# Patient Record
Sex: FEMALE | NOT HISPANIC OR LATINO | URBAN - METROPOLITAN AREA
[De-identification: names, ages, dates, MRNs, and addresses within clinical notes are randomized per-mention and may not be internally consistent; named-entity substitution may affect disease eponyms.]

---

## 2024-05-13 RX ORDER — NAPROXEN SODIUM 220 MG
220 TABLET ORAL
COMMUNITY

## 2024-05-13 RX ORDER — HYDROCHLOROTHIAZIDE 12.5 MG/1
12.5 TABLET ORAL DAILY
COMMUNITY
Start: 2024-01-25

## 2024-05-13 RX ORDER — ALENDRONATE SODIUM 70 MG/1
70 TABLET ORAL
COMMUNITY
Start: 2024-02-02 | End: 2024-05-14

## 2024-05-14 ENCOUNTER — OFFICE VISIT (OUTPATIENT)
Dept: INTERNAL MEDICINE | Facility: IMAGING CENTER | Age: 64
End: 2024-05-14
Payer: COMMERCIAL

## 2024-05-14 VITALS
OXYGEN SATURATION: 97 % | RESPIRATION RATE: 14 BRPM | HEIGHT: 63 IN | HEART RATE: 61 BPM | WEIGHT: 151 LBS | BODY MASS INDEX: 26.75 KG/M2 | SYSTOLIC BLOOD PRESSURE: 108 MMHG | DIASTOLIC BLOOD PRESSURE: 64 MMHG | TEMPERATURE: 97.6 F

## 2024-05-14 DIAGNOSIS — Z00.00 LABORATORY EXAMINATION ORDERED AS PART OF A ROUTINE GENERAL MEDICAL EXAMINATION: ICD-10-CM

## 2024-05-14 DIAGNOSIS — M80.00XA AGE-RELATED OSTEOPOROSIS WITH CURRENT PATHOLOGICAL FRACTURE, INITIAL ENCOUNTER: Primary | ICD-10-CM

## 2024-05-14 DIAGNOSIS — D80.1 HYPOGAMMAGLOBULINEMIA (HCC): ICD-10-CM

## 2024-05-14 DIAGNOSIS — Z76.89 ESTABLISHING CARE WITH NEW DOCTOR, ENCOUNTER FOR: ICD-10-CM

## 2024-05-14 PROBLEM — M81.0 OSTEOPOROSIS: Status: ACTIVE | Noted: 2018-11-01

## 2024-05-14 PROBLEM — R82.994 HYPERCALCINURIA: Status: ACTIVE | Noted: 2023-06-07

## 2024-05-14 PROBLEM — Z85.828 HISTORY OF SKIN CANCER: Status: ACTIVE | Noted: 2021-02-11

## 2024-05-14 PROBLEM — M19.90 ARTHRITIS: Status: ACTIVE | Noted: 2021-02-11

## 2024-05-14 PROBLEM — R76.8 LOW IMMUNOGLOBULIN LEVEL: Status: ACTIVE | Noted: 2024-03-12

## 2024-05-14 PROCEDURE — 3074F SYST BP LT 130 MM HG: CPT | Performed by: FAMILY MEDICINE

## 2024-05-14 PROCEDURE — 99204 OFFICE O/P NEW MOD 45 MIN: CPT | Performed by: FAMILY MEDICINE

## 2024-05-14 PROCEDURE — 3078F DIAST BP <80 MM HG: CPT | Performed by: FAMILY MEDICINE

## 2024-05-14 RX ORDER — CHLORTHALIDONE 25 MG/1
TABLET ORAL
COMMUNITY
Start: 2024-03-16 | End: 2024-05-14

## 2024-05-14 ASSESSMENT — PATIENT HEALTH QUESTIONNAIRE - PHQ9: CLINICAL INTERPRETATION OF PHQ2 SCORE: 0

## 2024-05-17 NOTE — PROGRESS NOTES
Verbal consent was acquired by the patient to use [employer] listening note generation during this visit     Patient is a 64 y.o.female.new  patient      History of Present Illness  The patient presents for evaluation of multiple medical concerns.    The patient underwent a DEXA scan under the supervision of her gyn, which revealed a significant decrease in bone density, exceeding nl decline .  She had a decrease in her bone density to 9 percent within a year.   She was initiated on vitamin D supplementation, adhering to a ca++   rich  diet w/   dairy products such as yogurt, coffee, cereal, ice cream, and cottage cheese.     Her   physician had planned to prescribe Fosamax, but due to the presence of calcium in her urine, a  panel was conducted.     Subsequently, she was referred to an endocrinologist, who has since retired, who prescribed hydrochlorothiazide and recommended a follow-up with her primary care physician. Her primary care physician then referred her to another endocrinologist in [place], who conducted a 24-hour urine test, which yielded consistent results.     Despite being on hydrochlorothiazide, her calcium levels remained in the 300 to 400 range.     Her endocrinologist increased the hydrochlorothiazide dosage to 25 mg, but she experienced dizziness and lightheadedness within a few weeks. She was advised to monitor her blood pressure using a blood pressure cuff and discontinue the medication if it drops below this dosage.     She reduced the hydrochlorothiazide dosage to 12.5 mg and began monitoring her blood pressure.    The patient was diagnosed with hypogammaglobulinemia by her hematologist. The hematologist did not detect  multiple myeloma and recommended a follow-up in 1 year.       Her last cholesterol check was in 06/2023. Her last Pap smear was conducted in 01/2023 by her   and she denies any abnormal gynecological issues.     She undergoes a colonoscopy every 5 years due to  "her family history. Her last colonoscopy was performed last year, which did not reveal any polyps.    The patient occasionally uses CBD   for back pain.     She has a long-standing history of low back pain, which has been debilitating.   She has been exercising and working on her core muscles. She has considered surgery and has received cortisone injections at the facet joints. Initially, she was seeing an orthopedist, who informed her of a 60 to 70 percent chance of resolving the pain with surgery. The first cortisone injection provided relief for over a year, but the second injection did not provide any relief. She denies any sciatica.     She has started Pilates, which has provided significant relief. She engages in walking and hiking for exercise, but can not run.     She smoked in high school.     She drinks a glass of wine 4 nights a week, 2 weeks, and then might not have it for 4 months.   She lives in [facility].     Her mother is still alive. Her father passed away at 92 years old. He had  heart disease, diabetes, and a couple of heart attacks, but no bypasses.  She has a strong family history of colon cancer on her maternal side. Her maternal grandmother and grandfather had colon cancer. Her mother, brothers, and father had polyps. Her grandfather had a colostomy. He passed away at 78 years old. Her mother had a hip replacement. She has osteoporosis and scoliosis of the spine. She does not have diabetes or heart disease.            /64   Pulse 61   Temp 36.4 °C (97.6 °F)   Resp 14   Ht 1.6 m (5' 3\")   Wt 68.5 kg (151 lb)   SpO2 97% , Body mass index is 26.75 kg/m².    Physical Exam      Physical Exam  Constitutional:       General: She is not in acute distress.     Appearance: Normal appearance. She is not ill-appearing, toxic-appearing or diaphoretic.   HENT:      Head: Normocephalic and atraumatic.   Eyes:      Conjunctiva/sclera: Conjunctivae normal.   Cardiovascular:      Rate and Rhythm: " Normal rate.   Pulmonary:      Effort: Pulmonary effort is normal.   Musculoskeletal:      Cervical back: Neck supple.   Neurological:      Mental Status: She is alert and oriented to person, place, and time. Mental status is at baseline.   Psychiatric:         Mood and Affect: Mood normal.         Behavior: Behavior normal.         Thought Content: Thought content normal.         Judgment: Judgment normal.             Results  Laboratory Studies  DEXA scan showed a significant amount of bone loss. Calcium in urine, low globulin, low immunoglobulin in urine.          Assessment & Plan  1. Osteoporosis.  A referral will be made for the patient to consult with an endocrinologist. Additionally, a prescription for Binosto was discussed    2. Hypogammaglobulinemia.  The patient's SPEP protein levels were found to be abnormal, prompting a referral to a hematologist.   referral to  hematology made    3. Health maintenance.  Laboratory tests will be conducted within the upcoming month.         1. Age-related osteoporosis with current pathological fracture, initial encounter  Referral to Endocrinology      2. Hypogammaglobulinemia (HCC)  Referral to Hematology Oncology      3. Laboratory examination ordered as part of a routine general medical examination  CBC WITH DIFFERENTIAL    TSH    Lipid Profile    Comp Metabolic Panel    VITAMIN D 25-HYDROXY    VITAMIN B12    HEMOGLOBIN A1C      4. Establishing care with new doctor, encounter for               This note was created using voice recognition software (Dragon). The accuracy of the dictation is limited by the abilities of the software. I have reviewed the note prior to signing, however some errors in grammar and context are still possible. If you have any questions related to this note please do not hesitate to contact our office.

## 2024-05-22 ENCOUNTER — TELEPHONE (OUTPATIENT)
Dept: ENDOCRINOLOGY | Facility: MEDICAL CENTER | Age: 64
End: 2024-05-22
Payer: COMMERCIAL

## 2024-05-22 NOTE — TELEPHONE ENCOUNTER
Left pt vm that Dr. CARVAJAL has cancellation for June 25th at 7:50. Pt had preferred being scheduled with Dr. CARVAJAL.

## 2024-06-17 ENCOUNTER — HOSPITAL ENCOUNTER (OUTPATIENT)
Dept: RADIOLOGY | Facility: MEDICAL CENTER | Age: 64
End: 2024-06-17
Attending: FAMILY MEDICINE
Payer: COMMERCIAL

## 2024-06-17 ENCOUNTER — OFFICE VISIT (OUTPATIENT)
Dept: INTERNAL MEDICINE | Facility: IMAGING CENTER | Age: 64
End: 2024-06-17
Payer: COMMERCIAL

## 2024-06-17 VITALS
SYSTOLIC BLOOD PRESSURE: 118 MMHG | TEMPERATURE: 97.1 F | DIASTOLIC BLOOD PRESSURE: 72 MMHG | OXYGEN SATURATION: 96 % | HEART RATE: 71 BPM | RESPIRATION RATE: 14 BRPM

## 2024-06-17 DIAGNOSIS — J20.9 ACUTE BRONCHITIS, UNSPECIFIED ORGANISM: ICD-10-CM

## 2024-06-17 DIAGNOSIS — R05.2 SUBACUTE COUGH: ICD-10-CM

## 2024-06-17 DIAGNOSIS — J01.40 SUBACUTE PANSINUSITIS: ICD-10-CM

## 2024-06-17 DIAGNOSIS — R06.02 SHORTNESS OF BREATH: ICD-10-CM

## 2024-06-17 PROCEDURE — 3078F DIAST BP <80 MM HG: CPT | Performed by: FAMILY MEDICINE

## 2024-06-17 PROCEDURE — 3074F SYST BP LT 130 MM HG: CPT | Performed by: FAMILY MEDICINE

## 2024-06-17 PROCEDURE — 71046 X-RAY EXAM CHEST 2 VIEWS: CPT

## 2024-06-17 PROCEDURE — 99214 OFFICE O/P EST MOD 30 MIN: CPT | Performed by: FAMILY MEDICINE

## 2024-06-17 RX ORDER — DOXYCYCLINE HYCLATE 100 MG
100 TABLET ORAL 2 TIMES DAILY
Qty: 20 TABLET | Refills: 0 | Status: SHIPPED | OUTPATIENT
Start: 2024-06-17

## 2024-06-18 NOTE — PATIENT INSTRUCTIONS
Acute Bronchitis, Adult    Acute bronchitis is when air tubes in the lungs (bronchi) suddenly get swollen. The condition can make it hard for you to breathe. In adults, acute bronchitis usually goes away within 2 weeks. A cough caused by bronchitis may last up to 3 weeks. Smoking, allergies, and asthma can make the condition worse.  What are the causes?  Germs that cause cold and flu (viruses). The most common cause of this condition is the virus that causes the common cold.  Bacteria.  Substances that bother (irritate) the lungs, including:  Smoke from cigarettes and other types of tobacco.  Dust and pollen.  Fumes from chemicals, gases, or burned fuel.  Indoor or outdoor air pollution.  What increases the risk?  A weak body's defense system. This is also called the immune system.  Any condition that affects your lungs and breathing, such as asthma.  What are the signs or symptoms?  A cough.  Coughing up clear, yellow, or green mucus.  Making high-pitched whistling sounds when you breathe, most often when you breathe out (wheezing).  Runny or stuffy nose.  Having too much mucus in your lungs (chest congestion).  Shortness of breath.  Body aches.  A sore throat.  How is this treated?  Acute bronchitis may go away over time without treatment. Your doctor may tell you to:  Drink more fluids. This will help thin your mucus so it is easier to cough up.  Use a device that gets medicine into your lungs (inhaler).  Use a vaporizer or a humidifier. These are machines that add water to the air. This helps with coughing and poor breathing.  Take a medicine that thins mucus and helps clear it from your lungs.  Take a medicine that prevents or stops coughing.  It is not common to take an antibiotic medicine for this condition.  Follow these instructions at home:    Take over-the-counter and prescription medicines only as told by your doctor.  Use an inhaler, vaporizer, or humidifier as told by your doctor.  Take two teaspoons  (10 mL) of honey at bedtime. This helps lessen your coughing at night.  Drink enough fluid to keep your pee (urine) pale yellow.  Do not smoke or use any products that contain nicotine or tobacco. If you need help quitting, ask your doctor.  Get a lot of rest.  Return to your normal activities when your doctor says that it is safe.  Keep all follow-up visits.  How is this prevented?    Wash your hands often with soap and water for at least 20 seconds. If you cannot use soap and water, use hand .  Avoid contact with people who have cold symptoms.  Try not to touch your mouth, nose, or eyes with your hands.  Avoid breathing in smoke or chemical fumes.  Make sure to get the flu shot every year.  Contact a doctor if:  Your symptoms do not get better in 2 weeks.  You have trouble coughing up the mucus.  Your cough keeps you awake at night.  You have a fever.  Get help right away if:  You cough up blood.  You have chest pain.  You have very bad shortness of breath.  You faint or keep feeling like you are going to faint.  You have a very bad headache.  Your fever or chills get worse.  These symptoms may be an emergency. Get help right away. Call your local emergency services (911 in the [place]  Do not wait to see if the symptoms will go away.  Do not drive yourself to the hospital.  Summary  Acute bronchitis is when air tubes in the lungs (bronchi) suddenly get swollen. In adults, acute bronchitis usually goes away within 2 weeks.  Drink more fluids. This will help thin your mucus so it is easier to cough up.  Take over-the-counter and prescription medicines only as told by your doctor.  Contact a doctor if your symptoms do not improve after 2 weeks of treatment.  This information is not intended to replace advice given to you by your health care provider. Make sure you discuss any questions you have with your health care provider.  Document Revised: 04/20/2022 Document Reviewed: 04/20/2022  [employer] Patient  Education © 2023 [employer]

## 2024-06-18 NOTE — PROGRESS NOTES
Chief Complaint   Patient presents with    Cough     Back pain with it.        HPI: Patient is a 64 y.o. female complaining of 2 m    of illness including: harsh cough, ear pain, nasal congestion, sinus involvement .      Similarly ill exposures: no.  Treatments tried: antihist./decong.  She  has no history on file for tobacco use..     ROS:  No fever, nausea, changes in bowel movements or skin rash.       I reviewed the patient's medications, allergies and medical history:  Current Outpatient Medications   Medication Sig Dispense Refill    doxycycline (VIBRAMYCIN) 100 MG Tab Take 1 Tablet by mouth 2 times a day. 20 Tablet 0    naproxen (ANAPROX) 220 MG tablet Take 220 mg by mouth.      hydroCHLOROthiazide 12.5 MG tablet Take 12.5 mg by mouth every day.      Cholecalciferol 1.25 MG (31153 UT) Tab Take 1 Capsule by mouth every 7 days.       No current facility-administered medications for this visit.     Codeine and Penicillins  History reviewed. No pertinent past medical history.     EXAM:  /72   Pulse 71   Temp 36.2 °C (97.1 °F)   Resp 14   SpO2 96%   General: Alert, no conversational dyspnea or audible wheeze, non-toxic appearance.  Eyes: PERRL, conjunctiva slightly injected, no eye discharge.  Ears: Normal pinnae,TM's air/fluid interface bilaterally.        Throat: Erythematous injection without exudate.   Neck: Supple   Lungs: Clear to auscultation bilaterally, no wheeze, crackles or rhonchi.   Heart: Regular rate without murmur.  Skin: Warm and dry without rash.         DX-CHEST-2 VIEWS  Narrative: 6/17/2024 12:53 PM    HISTORY/REASON FOR EXAM:  Shortness of Breath. Cough and mid back pain for one month.    TECHNIQUE/EXAM DESCRIPTION AND NUMBER OF VIEWS:  Two views of the chest.    COMPARISON:  None.    FINDINGS:  The cardiomediastinal silhouette is normal in size.  No pulmonary infiltrates or consolidations are noted.  No pleural effusions are appreciated.  No visible pneumothorax.  Remaining bony  and soft tissue structures are within normal limits.  Impression: No radiographic evidence of acute cardiopulmonary disease.     ASSESSMENT:     1. Acute bronchitis, unspecified organism       2. Subacute pansinusitis         PLAN:  Doxicycline x 10 d    Follow-up in office or urgent care for worsening symptoms, difficulty breathing, lack of expected recovery, or should new symptoms or problems arise.

## 2024-06-19 ENCOUNTER — HOSPITAL ENCOUNTER (OUTPATIENT)
Facility: MEDICAL CENTER | Age: 64
End: 2024-06-19
Attending: FAMILY MEDICINE
Payer: COMMERCIAL

## 2024-06-19 ENCOUNTER — NON-PROVIDER VISIT (OUTPATIENT)
Dept: INTERNAL MEDICINE | Facility: IMAGING CENTER | Age: 64
End: 2024-06-19
Payer: COMMERCIAL

## 2024-06-19 DIAGNOSIS — Z00.00 LABORATORY EXAMINATION ORDERED AS PART OF A ROUTINE GENERAL MEDICAL EXAMINATION: ICD-10-CM

## 2024-06-19 PROCEDURE — 84443 ASSAY THYROID STIM HORMONE: CPT

## 2024-06-19 PROCEDURE — 83036 HEMOGLOBIN GLYCOSYLATED A1C: CPT

## 2024-06-19 PROCEDURE — 80053 COMPREHEN METABOLIC PANEL: CPT

## 2024-06-19 PROCEDURE — 82607 VITAMIN B-12: CPT

## 2024-06-19 PROCEDURE — 85025 COMPLETE CBC W/AUTO DIFF WBC: CPT

## 2024-06-19 PROCEDURE — 80061 LIPID PANEL: CPT

## 2024-06-19 PROCEDURE — 82306 VITAMIN D 25 HYDROXY: CPT

## 2024-06-20 LAB
25(OH)D3 SERPL-MCNC: 53 NG/ML (ref 30–100)
ALBUMIN SERPL BCP-MCNC: 4.3 G/DL (ref 3.2–4.9)
ALBUMIN/GLOB SERPL: 2 G/DL
ALP SERPL-CCNC: 75 U/L (ref 30–99)
ALT SERPL-CCNC: 14 U/L (ref 2–50)
ANION GAP SERPL CALC-SCNC: 9 MMOL/L (ref 7–16)
AST SERPL-CCNC: 21 U/L (ref 12–45)
BASOPHILS # BLD AUTO: 0.6 % (ref 0–1.8)
BASOPHILS # BLD: 0.03 K/UL (ref 0–0.12)
BILIRUB SERPL-MCNC: 2 MG/DL (ref 0.1–1.5)
BUN SERPL-MCNC: 14 MG/DL (ref 8–22)
CALCIUM ALBUM COR SERPL-MCNC: 9.2 MG/DL (ref 8.5–10.5)
CALCIUM SERPL-MCNC: 9.4 MG/DL (ref 8.5–10.5)
CHLORIDE SERPL-SCNC: 101 MMOL/L (ref 96–112)
CHOLEST SERPL-MCNC: 258 MG/DL (ref 100–199)
CO2 SERPL-SCNC: 26 MMOL/L (ref 20–33)
CREAT SERPL-MCNC: 0.6 MG/DL (ref 0.5–1.4)
EOSINOPHIL # BLD AUTO: 0.1 K/UL (ref 0–0.51)
EOSINOPHIL NFR BLD: 2 % (ref 0–6.9)
ERYTHROCYTE [DISTWIDTH] IN BLOOD BY AUTOMATED COUNT: 41.4 FL (ref 35.9–50)
EST. AVERAGE GLUCOSE BLD GHB EST-MCNC: 114 MG/DL
GFR SERPLBLD CREATININE-BSD FMLA CKD-EPI: 101 ML/MIN/1.73 M 2
GLOBULIN SER CALC-MCNC: 2.2 G/DL (ref 1.9–3.5)
GLUCOSE SERPL-MCNC: 92 MG/DL (ref 65–99)
HBA1C MFR BLD: 5.6 % (ref 4–5.6)
HCT VFR BLD AUTO: 46.1 % (ref 37–47)
HDLC SERPL-MCNC: 107 MG/DL
HGB BLD-MCNC: 15.2 G/DL (ref 12–16)
IMM GRANULOCYTES # BLD AUTO: 0.01 K/UL (ref 0–0.11)
IMM GRANULOCYTES NFR BLD AUTO: 0.2 % (ref 0–0.9)
LDLC SERPL CALC-MCNC: 137 MG/DL
LYMPHOCYTES # BLD AUTO: 2.02 K/UL (ref 1–4.8)
LYMPHOCYTES NFR BLD: 41.3 % (ref 22–41)
MCH RBC QN AUTO: 30.2 PG (ref 27–33)
MCHC RBC AUTO-ENTMCNC: 33 G/DL (ref 32.2–35.5)
MCV RBC AUTO: 91.5 FL (ref 81.4–97.8)
MONOCYTES # BLD AUTO: 0.53 K/UL (ref 0–0.85)
MONOCYTES NFR BLD AUTO: 10.8 % (ref 0–13.4)
NEUTROPHILS # BLD AUTO: 2.2 K/UL (ref 1.82–7.42)
NEUTROPHILS NFR BLD: 45.1 % (ref 44–72)
NRBC # BLD AUTO: 0 K/UL
NRBC BLD-RTO: 0 /100 WBC (ref 0–0.2)
PLATELET # BLD AUTO: 273 K/UL (ref 164–446)
PMV BLD AUTO: 10 FL (ref 9–12.9)
POTASSIUM SERPL-SCNC: 4.1 MMOL/L (ref 3.6–5.5)
PROT SERPL-MCNC: 6.5 G/DL (ref 6–8.2)
RBC # BLD AUTO: 5.04 M/UL (ref 4.2–5.4)
SODIUM SERPL-SCNC: 136 MMOL/L (ref 135–145)
TRIGL SERPL-MCNC: 72 MG/DL (ref 0–149)
TSH SERPL DL<=0.005 MIU/L-ACNC: 1.71 UIU/ML (ref 0.38–5.33)
VIT B12 SERPL-MCNC: 513 PG/ML (ref 211–911)
WBC # BLD AUTO: 4.9 K/UL (ref 4.8–10.8)

## 2024-06-25 ENCOUNTER — OFFICE VISIT (OUTPATIENT)
Dept: ENDOCRINOLOGY | Facility: MEDICAL CENTER | Age: 64
End: 2024-06-25
Attending: INTERNAL MEDICINE
Payer: COMMERCIAL

## 2024-06-25 VITALS
BODY MASS INDEX: 26.75 KG/M2 | DIASTOLIC BLOOD PRESSURE: 56 MMHG | HEIGHT: 63 IN | WEIGHT: 151 LBS | OXYGEN SATURATION: 96 % | SYSTOLIC BLOOD PRESSURE: 98 MMHG | HEART RATE: 69 BPM

## 2024-06-25 DIAGNOSIS — E55.9 VITAMIN D DEFICIENCY: ICD-10-CM

## 2024-06-25 DIAGNOSIS — M81.0 AGE-RELATED OSTEOPOROSIS WITHOUT CURRENT PATHOLOGICAL FRACTURE: ICD-10-CM

## 2024-06-25 DIAGNOSIS — R82.994 IDIOPATHIC HYPERCALCIURIA: ICD-10-CM

## 2024-06-25 PROCEDURE — 99212 OFFICE O/P EST SF 10 MIN: CPT | Performed by: INTERNAL MEDICINE

## 2024-06-25 RX ORDER — 0.9 % SODIUM CHLORIDE 0.9 %
10 VIAL (ML) INJECTION PRN
OUTPATIENT
Start: 2024-06-25

## 2024-06-25 RX ORDER — ZOLEDRONIC ACID 5 MG/100ML
5 INJECTION, SOLUTION INTRAVENOUS ONCE
OUTPATIENT
Start: 2024-06-25 | End: 2024-06-25

## 2024-06-25 RX ORDER — 0.9 % SODIUM CHLORIDE 0.9 %
VIAL (ML) INJECTION PRN
OUTPATIENT
Start: 2024-06-25

## 2024-06-25 RX ORDER — METHYLPREDNISOLONE SODIUM SUCCINATE 125 MG/2ML
125 INJECTION, POWDER, LYOPHILIZED, FOR SOLUTION INTRAMUSCULAR; INTRAVENOUS PRN
OUTPATIENT
Start: 2024-06-25

## 2024-06-25 RX ORDER — EPINEPHRINE 1 MG/ML(1)
0.5 AMPUL (ML) INJECTION PRN
OUTPATIENT
Start: 2024-06-25

## 2024-06-25 RX ORDER — CHLORTHALIDONE 25 MG/1
25 TABLET ORAL DAILY
Qty: 30 TABLET | Refills: 11 | Status: SHIPPED | OUTPATIENT
Start: 2024-06-25

## 2024-06-25 RX ORDER — 0.9 % SODIUM CHLORIDE 0.9 %
3 VIAL (ML) INJECTION PRN
OUTPATIENT
Start: 2024-06-25

## 2024-06-25 RX ORDER — DIPHENHYDRAMINE HYDROCHLORIDE 50 MG/ML
50 INJECTION INTRAMUSCULAR; INTRAVENOUS PRN
OUTPATIENT
Start: 2024-06-25

## 2024-06-25 RX ORDER — SODIUM CHLORIDE 9 MG/ML
INJECTION, SOLUTION INTRAVENOUS CONTINUOUS
OUTPATIENT
Start: 2024-06-25

## 2024-06-25 ASSESSMENT — FIBROSIS 4 INDEX: FIB4 SCORE: 1.27463053835156245

## 2024-06-25 NOTE — PROGRESS NOTES
Chief Complaint: Consult requested by Chloé Hansen M.D. for evaluation of Osteoporosis and idiopathic hypercalciuria      Subjective:      Aruna Washington is a 64 y.o. female who I am asked to see in consultation for evaluation osteoporosis.     She was initially diagnosed with osteopenia on October 18, 2018 with the lowest T-score of -2.0 for the lumbar spine.  Follow-up bone density on September 10, 2020 showed progression to osteoporosis with lowest T-score of -2.5 her lumbar spine with 9% bone loss when compared to previous bone density.  She then had a workup done by her OB/GYN and was diagnosed with hypercalciuria in 2018 her baseline 24 urine calcium was over 400.  She had a thorough workup by an endocrinologist ruling out multiple myeloma, parathyroid disorder, thyroid disorder, Paget's disease, and milk-alkali syndrome.  She was initially treated with hydrochlorothiazide 12.5 mg daily.  However her repeat 24 urine calcium was not significantly better.  Incidentally at that time she was also treated with Fosamax for her osteoporosis but she was not able to tolerate the medication because of GI side effects.  She also tried a higher dose of hydrochlorothiazide when her 24 urine calcium was still high but she did not tolerate the higher dose because of dizziness.  She went back to the 12.5 mg dose.  She was also referred to a hematologist when she had an abnormal SPEP showing low gammaglobulin levels but ultimately she was not diagnosed with multiple myeloma.      Prior to departing [place] her last bone density was on January 10, 2023 showing the lowest T-score of -2.7 for the lumbar spine compatible with osteoporosis.    She is currently not treated with bisphosphonates.  She is not taking any calcium supplements she is taking vitamin D3 supplements 5000 IU 2 times per week    With respect to her idiopathic hypercalciuria she denies a history of kidney stones.  No history of previous chemotherapy  no history of ifosfamide exposure.  She does not have metabolic acidosis or signs of renal tubular acidosis on her previous lab work.  As previously stated hyperparathyroidism and hyperthyroidism have been ruled out as well as multiple myeloma.      She did have recent labs done by her primary care on June 19, 2024 showing normal TSH of 1.7 vitamin D of 53 B12 513 calcium was 9.4 with a serum creatinine 0.60 and estimated GFR of 101.          Osteoporosis Risk Factors   Nonmodifiable  Personal Hx of fracture as an adult: no  Hx of fracture in first-degree relative: no   race: yes  Advanced age: no  Female sex: yes  Dementia: no  Poor health/frailty: no     Potentially modifiable:  Tobacco use: no  Low body weight (<127 lbs): no  Estrogen deficiency     early menopause (age <45) or bilateral ovariectomy: no     prolonged premenopausal amenorrhea (>1 yr): no  Low calcium intake (lifelong): yes but due to hypercalciuria  Alcoholism: no  Recurrent falls: no  Inadequate physical activity: no    Current calcium and Vit D intake:  Dietary sources: minimal   supplements: no calcium vitamin D3 5000IU      Patient's medications, allergies, and social histories were reviewed and updated as appropriate.      ROS:     CONS:     No fever, no chills, no weight loss, no fatigue   EYES:      No diplopia, no blurry vision, no redness of eyes, no swelling of eyelids   ENT:    No hearing loss, No ear pain, No sore throat, no dysphagia, no neck swelling   CV:     No chest pain, no palpitations, no claudication, no orthopnea, no PND   PULM:    No SOB, no cough, no hemoptysis, no wheezing    GI:   No nausea, no vomiting, no diarrhea, no constipation, no bloody stools   :  Passing urine well, no dysuria, no hematuria   ENDO:   No polyuria, no polydipsia, no heat intolerance, no cold intolerance   NEURO: No headaches, no dizziness, no convulsions, no tremors   MUSC:  No joint swellings, no arthralgias, no myalgias, no weakness    SKIN:   No rash, no ulcers, no dry skin   PSYCH:   No depression, no anxiety, no difficulty sleeping       Past Medical History:  Patient Active Problem List    Diagnosis Date Noted    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     Allergies:  Codeine and Penicillins     Current Medications:    Current Outpatient Medications:     VITAMIN D, CHOLECALCIFEROL, PO, Take 5,000 Int'l Units/1.7m2 by mouth. 2 times a week only per patient, Disp: , Rfl:     doxycycline (VIBRAMYCIN) 100 MG Tab, Take 1 Tablet by mouth 2 times a day., Disp: 20 Tablet, Rfl: 0    naproxen (ANAPROX) 220 MG tablet, Take 220 mg by mouth., Disp: , Rfl:     hydroCHLOROthiazide 12.5 MG tablet, Take 12.5 mg by mouth every day., Disp: , Rfl:     Social History:  Social History     Socioeconomic History    Marital status:      Spouse name: Not on file    Number of children: Not on file    Years of education: Not on file    Highest education level: Not on file   Occupational History    Not on file   Tobacco Use    Smoking status: Former     Types: Cigarettes    Smokeless tobacco: Never    Tobacco comments:     Smoke in high school   Substance and Sexual Activity    Alcohol use: Yes     Comment: on occasion    Drug use: Never    Sexual activity: Not on file   Other Topics Concern    Not on file   Social History Narrative    Not on file     Social Determinants of Health     Financial Resource Strain: Not on file   Food Insecurity: No Food Insecurity (2/12/2024)    Received from [facility]    Food Insecurity     Within the past 12 months, you worried that your food would run out before you got the money to buy more.: Never true   Transportation Needs: Not on file   Physical Activity: Not on file   Stress: Not on file   Social  "Connections: Not on file   Intimate Partner Violence: Low Risk  (2/14/2024)    [facility]     Patient unable to answer: Not on file     Does the patient feel safe from violence, abuse, and neglect in their home?: 1   Recent Concern: Intimate Partner Violence - High Risk (2/14/2024)    Received from [facility]     Patient unable to answer: Not on file     Does the patient feel safe from violence, abuse, and neglect in their home?: 1   Housing Stability: Not on file        Family History:   No family history on file.      PHYSICAL EXAM:   Vital signs: BP 98/56   Pulse 69   Ht 1.6 m (5' 3\")   Wt 68.5 kg (151 lb)   SpO2 96%   BMI 26.75 kg/m²   GENERAL: Well-developed, well-nourished  in no apparent distress.   EYE: No ocular and eyelid asymmetry, Anicteric sclerae,  PERRL, No exophthalmos or lidlag  HENT: Hearing grossly intact, Normocephalic, atraumatic. Pink, moist mucous membranes, No exudate  NECK: Supple. Trachea midline. thyroid is normal in size without nodules or tenderness  CARDIOVASCULAR: Regular rate and rhythm. No murmurs, rubs, or gallops.   LUNGS: Clear to auscultation bilaterally   ABDOMEN: Soft, nontender with positive bowel sounds.   EXTREMITIES: No clubbing, cyanosis, or edema.   NEUROLOGICAL: Cranial nerves II-XII are grossly intact   Symmetric reflexes at the patella no proximal muscle weakness, No visible tremor with both outstretched hands  LYMPH: No cervical, supraclavicular,  adenopathy palpated.   SKIN: No rashes, lesions. Turgor is normal.    Labs:  Lab Results   Component Value Date/Time    WBC 4.9 06/19/2024 09:25 AM    RBC 5.04 06/19/2024 09:25 AM    HEMOGLOBIN 15.2 06/19/2024 09:25 AM    MCV 91.5 06/19/2024 09:25 AM    MCH 30.2 06/19/2024 09:25 AM    MCHC 33.0 06/19/2024 09:25 AM    RDW 41.4 06/19/2024 09:25 AM    MPV 10.0 06/19/2024 09:25 AM       Lab Results   Component Value Date/Time    SODIUM 136 06/19/2024 09:25 AM    POTASSIUM 4.1 " "06/19/2024 09:25 AM    CHLORIDE 101 06/19/2024 09:25 AM    CO2 26 06/19/2024 09:25 AM    ANION 9.0 06/19/2024 09:25 AM    GLUCOSE 92 06/19/2024 09:25 AM    BUN 14 06/19/2024 09:25 AM    CREATININE 0.60 06/19/2024 09:25 AM    CALCIUM 9.4 06/19/2024 09:25 AM    ASTSGOT 21 06/19/2024 09:25 AM    ALTSGPT 14 06/19/2024 09:25 AM    TBILIRUBIN 2.0 (H) 06/19/2024 09:25 AM    ALBUMIN 4.3 06/19/2024 09:25 AM    TOTPROTEIN 6.5 06/19/2024 09:25 AM    GLOBULIN 2.2 06/19/2024 09:25 AM    AGRATIO 2.0 06/19/2024 09:25 AM       Lab Results   Component Value Date/Time    TSHULTRASEN 1.710 06/19/2024 0925     No results found for: \"FREET4\"  No results found for: \"FREET3\"  No results found for: \"THYSTIMIG\"      Imaging:  IMPRESSION:    Lowest T-score: -2.7  Category: Osteoporosis.    RECOMMENDED FOLLOW UP INTERVAL:  The following are \"general guidelines\" from [facility] clinical management papers and based upon the assumption of a healthy patient not currently undergoing treatment for osteoporosis or having secondary risk factors for accelerated bone loss.    T-score >-2.0:  3-5 years  T-score -2.0 to <-2.5:  2-3 years  T-score -2.5 or less:  2 years    **Follow-up exams should always be performed on the same machine as the original test whenever possible. Scans performed on different machines cannot be directly correlated. **    Dictated by: Warren Kaye M.D. on 1/26/2023 2:06 PM PST   Electronically signed by: Warren Kaye M.D. on 1/26/2023 2:08 PM PST  Narrative    DEXA BONE DENSITY STUDY WO VERT FX ASSESSMENT    INDICATION: Age-related osteoporosis without current pathological fracture    TECHNIQUE: Bone densitometry was obtained scanning on a [employer] Discovery DXA unit.    COMPARISON: None.    FINDINGS:    Left Hip  T-Score: -1.8  BMD: 0.725 g/cm2    Left Femoral neck  T-Score: -2.2  BMD: 0.608 g/cm2    Lumbar spine  T-Score: -2.7  BMD: 0.749 g/cm2    LSC at this DXA center:  Spine: 0.025 g/cm2  Hip: 0.022 g/cm2  Forearm: " 0.018 g/cm2    WHO CLASSIFICATION (based upon T-score)  Normal -1.0 or greater  Osteopenia -1.1 to -2.4  Osteoporosis -2.5 or less  Exam End: 01/26/23  1:25 PM    Specimen Collected: 01/26/23  2:06 PM Last Resulted: 01/26/23  2:08 PM   Received From: [facility]  Result Received: 05/13/24 11:49 AM         ASSESSMENT/PLAN:     1. Age-related osteoporosis without current pathological fracture  Unstable reviewed pathogenesis of osteoporosis and overview of therapy.  She has tried and failed oral bisphosphonates.  She has risk factors for bone loss particularly her idiopathic hypercalciuria.  I want her to try Reclast and I am sending the order to the infusion center.  Reviewed the side effects of the medication.  Reviewed the importance of premedication prior to infusions.  I want her to avoid calcium supplements for now  I want her to reduce her vitamin D intake  Discussed the importance of regular weightbearing exercise and reviewed fall precautions  I am getting a baseline new bone density  I am getting additional labs and checking bone markers as well as repeating her PTH and SPEP and phosphorus levels.    2. Idiopathic hypercalciuria  Uncontrolled.  Her 24-hour urine calcium levels remain elevated on hydrochlorothiazide   She is on a low-dose of hydrochlorothiazide and cannot tolerate 25 mg dose of HCTZ.  I did explain to the patient that HCTZ is a short acting drug and her idiopathic hypercalciuria may be better controlled with a 24-hour duration thiazide diuretic such as chlorthalidone.  I will try chlorthalidone 12.5 mg daily she will have to cut the 25 mg pill in half.    She will also reduce her vitamin D intake to 2000 IU daily as excess of vitamin D intake can exacerbate hypercalciuria due to increased absorption of calcium  Will try to repeat her 24 urine calcium in a month.    3. Vitamin D deficiency  Stable   Vitamin D labs were reviewed with patient  She will  reduce her vitamin D supplements  Continue monitoring levels         Return in about 6 months (around 12/25/2024).      Total time spent on day of service was over 60 minutes which included obtaining a detailed history and physical exam, ordering labs, coordinating care and scheduling future follow-up     This patient during there office visit was started on new medication.  Side effects of new medications were discussed with the patient today in the office. The patient was supplied paperwork on this new medication.    Thank you kindly for allowing me to participate in the endocrine care plan for this patient.    Mich Aguiar MD, FACE, N      CC:   Chloé Hansen M.D.

## 2024-06-26 ENCOUNTER — HOSPITAL ENCOUNTER (OUTPATIENT)
Dept: RADIOLOGY | Facility: MEDICAL CENTER | Age: 64
End: 2024-06-26
Attending: FAMILY MEDICINE
Payer: COMMERCIAL

## 2024-06-26 ENCOUNTER — OFFICE VISIT (OUTPATIENT)
Dept: INTERNAL MEDICINE | Facility: IMAGING CENTER | Age: 64
End: 2024-06-26
Payer: COMMERCIAL

## 2024-06-26 VITALS
OXYGEN SATURATION: 97 % | HEIGHT: 63 IN | WEIGHT: 150 LBS | BODY MASS INDEX: 26.58 KG/M2 | HEART RATE: 72 BPM | SYSTOLIC BLOOD PRESSURE: 110 MMHG | DIASTOLIC BLOOD PRESSURE: 70 MMHG | RESPIRATION RATE: 14 BRPM | TEMPERATURE: 97.7 F

## 2024-06-26 DIAGNOSIS — Z00.00 WELLNESS EXAMINATION: ICD-10-CM

## 2024-06-26 DIAGNOSIS — Z13.6 SCREENING FOR CARDIOVASCULAR CONDITION: ICD-10-CM

## 2024-06-26 DIAGNOSIS — E78.5 HYPERLIPIDEMIA, UNSPECIFIED HYPERLIPIDEMIA TYPE: ICD-10-CM

## 2024-06-26 DIAGNOSIS — M80.00XA AGE-RELATED OSTEOPOROSIS WITH CURRENT PATHOLOGICAL FRACTURE, INITIAL ENCOUNTER: ICD-10-CM

## 2024-06-26 PROCEDURE — 3078F DIAST BP <80 MM HG: CPT | Performed by: FAMILY MEDICINE

## 2024-06-26 PROCEDURE — 3074F SYST BP LT 130 MM HG: CPT | Performed by: FAMILY MEDICINE

## 2024-06-26 PROCEDURE — 99396 PREV VISIT EST AGE 40-64: CPT | Mod: 25 | Performed by: FAMILY MEDICINE

## 2024-06-26 PROCEDURE — 4410556 CT-CARDIAC SCORING (SELF PAY ONLY)

## 2024-06-26 RX ORDER — ROSUVASTATIN CALCIUM 5 MG/1
5 TABLET, COATED ORAL EVERY EVENING
Qty: 90 TABLET | Refills: 3 | Status: SHIPPED | OUTPATIENT
Start: 2024-06-26

## 2024-06-26 ASSESSMENT — FIBROSIS 4 INDEX: FIB4 SCORE: 1.27463053835156245

## 2024-06-28 NOTE — PROGRESS NOTES
"Verbal consent was acquired by the patient to use [employer] ambient listening note generation during this visit     Patient is a 64 y.o.female.established patient who presents today for annual wellness visit    History of Present Illness  The patient  was treated w/ doxycycline starting on June 17 for both sinus and bronchitis    The patient reports a slight diminished hearing, however, she expresses concern about potential fluid accumulation in her ear, attributing this to her ongoing doxycycline treatment. She also mentions a persistent feeling of unresolved sinus and chest symptoms. She negates the presence of fever.    Is followed by Dr. Ye and will be doing Reclast treatments for osteoporosis  He is helping evaluate her hypercalciuria and abnormal proteins detected in her blood        Her diet is improved and she is walking, Pilates and weight resistance training    She has a history of lower back pain    She has concerns about her bilirubin on her blood work    She denies any GI complaints, CV, resp, bladder c/o    She sees gynecology for her GYN care    /70   Pulse 72   Temp 36.5 °C (97.7 °F)   Resp 14   Ht 1.6 m (5' 2.99\")   Wt 68 kg (150 lb)   SpO2 97% , Body mass index is 26.58 kg/m².    Physical Exam      Physical Exam  Constitutional:       Appearance: Normal appearance. She is normal weight.   HENT:      Head: Normocephalic and atraumatic.      Right Ear: Ear canal and external ear normal.      Left Ear: Tympanic membrane, ear canal and external ear normal.      Mouth/Throat:      Pharynx: Oropharynx is clear.   Eyes:      Extraocular Movements: Extraocular movements intact.      Conjunctiva/sclera: Conjunctivae normal.   Cardiovascular:      Rate and Rhythm: Normal rate and regular rhythm.      Heart sounds: Normal heart sounds.   Pulmonary:      Effort: Pulmonary effort is normal.      Breath sounds: Normal breath sounds.   Abdominal:      General: Abdomen is flat.      Palpations: " Abdomen is soft. There is no mass.      Tenderness: There is no abdominal tenderness.   Musculoskeletal:      Cervical back: Neck supple.      Right lower leg: No edema.      Left lower leg: No edema.   Lymphadenopathy:      Cervical: No cervical adenopathy.   Skin:     General: Skin is warm and dry.      Findings: No erythema or rash.   Neurological:      General: No focal deficit present.      Mental Status: She is alert.   Psychiatric:         Mood and Affect: Mood normal.         Behavior: Behavior normal.         Thought Content: Thought content normal.         Judgment: Judgment normal.             Results            Assessment & Plan      1. Wellness examination    2. Age-related osteoporosis with current pathological fracture, initial encounter-managed by Endo, along with hypercalciuria and protein workup    3. Hyperlipidemia, unspecified hyperlipidemia type-start a trial of Crestor, discussed potential side effects and following lab follow-ups  - Comp Metabolic Panel; Future  - Lipid Profile; Future    4. Screening for cardiovascular condition  - CT-CARDIAC SCORING; Future    Other orders  - rosuvastatin (CRESTOR) 5 MG Tab; Take 1 Tablet by mouth every evening.  Dispense: 90 Tablet; Refill: 3              This note was created using voice recognition software (Dragon). The accuracy of the dictation is limited by the abilities of the software. I have reviewed the note prior to signing, however some errors in grammar and context are still possible. If you have any questions related to this note please do not hesitate to contact our office.

## 2024-07-23 ENCOUNTER — OUTPATIENT INFUSION SERVICES (OUTPATIENT)
Dept: ONCOLOGY | Facility: MEDICAL CENTER | Age: 64
End: 2024-07-23
Attending: INTERNAL MEDICINE
Payer: COMMERCIAL

## 2024-07-23 VITALS
BODY MASS INDEX: 26.56 KG/M2 | DIASTOLIC BLOOD PRESSURE: 67 MMHG | HEART RATE: 68 BPM | RESPIRATION RATE: 18 BRPM | HEIGHT: 63 IN | WEIGHT: 149.91 LBS | TEMPERATURE: 98 F | OXYGEN SATURATION: 98 % | SYSTOLIC BLOOD PRESSURE: 115 MMHG

## 2024-07-23 DIAGNOSIS — M80.00XA AGE-RELATED OSTEOPOROSIS WITH CURRENT PATHOLOGICAL FRACTURE, INITIAL ENCOUNTER: ICD-10-CM

## 2024-07-23 LAB
CA-I BLD ISE-SCNC: 1.14 MMOL/L (ref 1.1–1.3)
CREAT BLD-MCNC: 0.7 MG/DL (ref 0.5–1.4)

## 2024-07-23 PROCEDURE — 700111 HCHG RX REV CODE 636 W/ 250 OVERRIDE (IP): Mod: JZ | Performed by: INTERNAL MEDICINE

## 2024-07-23 PROCEDURE — 82565 ASSAY OF CREATININE: CPT

## 2024-07-23 PROCEDURE — 82330 ASSAY OF CALCIUM: CPT

## 2024-07-23 RX ORDER — SODIUM CHLORIDE 9 MG/ML
INJECTION, SOLUTION INTRAVENOUS CONTINUOUS
OUTPATIENT
Start: 2025-07-24

## 2024-07-23 RX ORDER — ZOLEDRONIC ACID 5 MG/100ML
5 INJECTION, SOLUTION INTRAVENOUS ONCE
Status: COMPLETED | OUTPATIENT
Start: 2024-07-23 | End: 2024-07-23

## 2024-07-23 RX ORDER — 0.9 % SODIUM CHLORIDE 0.9 %
10 VIAL (ML) INJECTION PRN
OUTPATIENT
Start: 2025-07-24

## 2024-07-23 RX ORDER — 0.9 % SODIUM CHLORIDE 0.9 %
VIAL (ML) INJECTION PRN
OUTPATIENT
Start: 2025-07-24

## 2024-07-23 RX ORDER — DIPHENHYDRAMINE HYDROCHLORIDE 50 MG/ML
50 INJECTION INTRAMUSCULAR; INTRAVENOUS PRN
OUTPATIENT
Start: 2025-07-24

## 2024-07-23 RX ORDER — 0.9 % SODIUM CHLORIDE 0.9 %
3 VIAL (ML) INJECTION PRN
OUTPATIENT
Start: 2025-07-24

## 2024-07-23 RX ORDER — ZOLEDRONIC ACID 5 MG/100ML
5 INJECTION, SOLUTION INTRAVENOUS ONCE
OUTPATIENT
Start: 2025-07-24 | End: 2025-07-24

## 2024-07-23 RX ORDER — METHYLPREDNISOLONE SODIUM SUCCINATE 125 MG/2ML
125 INJECTION, POWDER, LYOPHILIZED, FOR SOLUTION INTRAMUSCULAR; INTRAVENOUS PRN
OUTPATIENT
Start: 2025-07-24

## 2024-07-23 RX ORDER — EPINEPHRINE 1 MG/ML(1)
0.5 AMPUL (ML) INJECTION PRN
OUTPATIENT
Start: 2025-07-24

## 2024-07-23 RX ADMIN — ZOLEDRONIC ACID 5 MG: 5 INJECTION, SOLUTION INTRAVENOUS at 08:21

## 2024-07-23 ASSESSMENT — FIBROSIS 4 INDEX: FIB4 SCORE: 1.27463053835156245

## 2024-07-24 ENCOUNTER — APPOINTMENT (OUTPATIENT)
Dept: LAB | Facility: MEDICAL CENTER | Age: 64
End: 2024-07-24
Payer: COMMERCIAL

## 2024-07-25 ENCOUNTER — HOSPITAL ENCOUNTER (OUTPATIENT)
Dept: RADIOLOGY | Facility: MEDICAL CENTER | Age: 64
End: 2024-07-25
Attending: INTERNAL MEDICINE
Payer: COMMERCIAL

## 2024-07-25 DIAGNOSIS — M81.0 AGE-RELATED OSTEOPOROSIS WITHOUT CURRENT PATHOLOGICAL FRACTURE: ICD-10-CM

## 2024-07-25 PROCEDURE — 77080 DXA BONE DENSITY AXIAL: CPT

## 2024-09-25 ENCOUNTER — APPOINTMENT (OUTPATIENT)
Dept: LAB | Facility: MEDICAL CENTER | Age: 64
End: 2024-09-25
Payer: COMMERCIAL

## 2024-09-26 ENCOUNTER — HOSPITAL ENCOUNTER (OUTPATIENT)
Facility: MEDICAL CENTER | Age: 64
End: 2024-09-26
Attending: FAMILY MEDICINE
Payer: COMMERCIAL

## 2024-09-26 ENCOUNTER — HOSPITAL ENCOUNTER (OUTPATIENT)
Dept: RADIOLOGY | Facility: MEDICAL CENTER | Age: 64
End: 2024-09-26
Attending: FAMILY MEDICINE
Payer: COMMERCIAL

## 2024-09-26 ENCOUNTER — OFFICE VISIT (OUTPATIENT)
Dept: INTERNAL MEDICINE | Facility: IMAGING CENTER | Age: 64
End: 2024-09-26
Payer: COMMERCIAL

## 2024-09-26 VITALS
HEART RATE: 66 BPM | OXYGEN SATURATION: 96 % | SYSTOLIC BLOOD PRESSURE: 110 MMHG | BODY MASS INDEX: 26.56 KG/M2 | TEMPERATURE: 98.1 F | DIASTOLIC BLOOD PRESSURE: 70 MMHG | RESPIRATION RATE: 14 BRPM | HEIGHT: 63 IN

## 2024-09-26 DIAGNOSIS — M79.674 PAIN OF RIGHT GREAT TOE: ICD-10-CM

## 2024-09-26 LAB
BASOPHILS # BLD AUTO: 0.9 % (ref 0–1.8)
BASOPHILS # BLD: 0.06 K/UL (ref 0–0.12)
EOSINOPHIL # BLD AUTO: 0.19 K/UL (ref 0–0.51)
EOSINOPHIL NFR BLD: 3 % (ref 0–6.9)
ERYTHROCYTE [DISTWIDTH] IN BLOOD BY AUTOMATED COUNT: 43.8 FL (ref 35.9–50)
ERYTHROCYTE [SEDIMENTATION RATE] IN BLOOD BY WESTERGREN METHOD: 5 MM/HOUR (ref 0–25)
HCT VFR BLD AUTO: 41.6 % (ref 37–47)
HGB BLD-MCNC: 14.2 G/DL (ref 12–16)
IMM GRANULOCYTES # BLD AUTO: 0.01 K/UL (ref 0–0.11)
IMM GRANULOCYTES NFR BLD AUTO: 0.2 % (ref 0–0.9)
LYMPHOCYTES # BLD AUTO: 2.29 K/UL (ref 1–4.8)
LYMPHOCYTES NFR BLD: 35.7 % (ref 22–41)
MCH RBC QN AUTO: 30.5 PG (ref 27–33)
MCHC RBC AUTO-ENTMCNC: 34.1 G/DL (ref 32.2–35.5)
MCV RBC AUTO: 89.3 FL (ref 81.4–97.8)
MONOCYTES # BLD AUTO: 0.8 K/UL (ref 0–0.85)
MONOCYTES NFR BLD AUTO: 12.5 % (ref 0–13.4)
NEUTROPHILS # BLD AUTO: 3.06 K/UL (ref 1.82–7.42)
NEUTROPHILS NFR BLD: 47.7 % (ref 44–72)
NRBC # BLD AUTO: 0 K/UL
NRBC BLD-RTO: 0 /100 WBC (ref 0–0.2)
PLATELET # BLD AUTO: 251 K/UL (ref 164–446)
PMV BLD AUTO: 10.1 FL (ref 9–12.9)
RBC # BLD AUTO: 4.66 M/UL (ref 4.2–5.4)
URATE SERPL-MCNC: 3.5 MG/DL (ref 1.9–8.2)
WBC # BLD AUTO: 6.4 K/UL (ref 4.8–10.8)

## 2024-09-26 PROCEDURE — 84550 ASSAY OF BLOOD/URIC ACID: CPT

## 2024-09-26 PROCEDURE — 85025 COMPLETE CBC W/AUTO DIFF WBC: CPT

## 2024-09-26 PROCEDURE — 99214 OFFICE O/P EST MOD 30 MIN: CPT | Performed by: FAMILY MEDICINE

## 2024-09-26 PROCEDURE — 3078F DIAST BP <80 MM HG: CPT | Performed by: FAMILY MEDICINE

## 2024-09-26 PROCEDURE — 85652 RBC SED RATE AUTOMATED: CPT

## 2024-09-26 PROCEDURE — 73660 X-RAY EXAM OF TOE(S): CPT | Mod: RT

## 2024-09-26 PROCEDURE — 3074F SYST BP LT 130 MM HG: CPT | Performed by: FAMILY MEDICINE

## 2024-09-26 RX ORDER — DOXYCYCLINE HYCLATE 100 MG
100 TABLET ORAL 2 TIMES DAILY
Qty: 20 TABLET | Refills: 0 | Status: SHIPPED | OUTPATIENT
Start: 2024-09-26

## 2024-09-28 ENCOUNTER — APPOINTMENT (OUTPATIENT)
Dept: LAB | Facility: MEDICAL CENTER | Age: 64
End: 2024-09-28
Payer: COMMERCIAL

## 2024-09-28 ENCOUNTER — HOSPITAL ENCOUNTER (OUTPATIENT)
Facility: MEDICAL CENTER | Age: 64
End: 2024-09-28
Attending: INTERNAL MEDICINE
Payer: COMMERCIAL

## 2024-09-28 DIAGNOSIS — R82.994 IDIOPATHIC HYPERCALCIURIA: ICD-10-CM

## 2024-09-28 PROCEDURE — 82340 ASSAY OF CALCIUM IN URINE: CPT

## 2024-09-30 LAB
CALCIUM 24H UR-MCNC: 15.7 MG/DL
CALCIUM 24H UR-MRATE: 236 MG/D (ref 100–250)
CALCIUM/CREAT 24H UR: 231 MG/G (ref 20–300)
COLLECT DURATION TIME SPEC: 24 HR
CREAT 24H UR-MCNC: 68 MG/DL
SPECIMEN VOL ?TM UR: 1500 ML

## 2024-10-16 ENCOUNTER — HOSPITAL ENCOUNTER (OUTPATIENT)
Dept: LAB | Facility: MEDICAL CENTER | Age: 64
End: 2024-10-16
Attending: INTERNAL MEDICINE
Payer: COMMERCIAL

## 2024-10-16 ENCOUNTER — HOSPITAL ENCOUNTER (OUTPATIENT)
Dept: LAB | Facility: MEDICAL CENTER | Age: 64
End: 2024-10-16
Attending: FAMILY MEDICINE
Payer: COMMERCIAL

## 2024-10-16 ENCOUNTER — APPOINTMENT (OUTPATIENT)
Dept: LAB | Facility: MEDICAL CENTER | Age: 64
End: 2024-10-16
Payer: COMMERCIAL

## 2024-10-16 DIAGNOSIS — E55.9 VITAMIN D DEFICIENCY: ICD-10-CM

## 2024-10-16 DIAGNOSIS — E78.5 HYPERLIPIDEMIA, UNSPECIFIED HYPERLIPIDEMIA TYPE: ICD-10-CM

## 2024-10-16 DIAGNOSIS — M81.0 AGE-RELATED OSTEOPOROSIS WITHOUT CURRENT PATHOLOGICAL FRACTURE: ICD-10-CM

## 2024-10-16 DIAGNOSIS — R82.994 IDIOPATHIC HYPERCALCIURIA: ICD-10-CM

## 2024-10-16 LAB
25(OH)D3 SERPL-MCNC: 56 NG/ML (ref 30–100)
ALBUMIN SERPL BCP-MCNC: 4.4 G/DL (ref 3.2–4.9)
ALBUMIN SERPL BCP-MCNC: 4.4 G/DL (ref 3.2–4.9)
ALBUMIN/GLOB SERPL: 1.9 G/DL
ALBUMIN/GLOB SERPL: 2 G/DL
ALP SERPL-CCNC: 56 U/L (ref 30–99)
ALP SERPL-CCNC: 56 U/L (ref 30–99)
ALT SERPL-CCNC: 16 U/L (ref 2–50)
ALT SERPL-CCNC: 18 U/L (ref 2–50)
ANION GAP SERPL CALC-SCNC: 12 MMOL/L (ref 7–16)
ANION GAP SERPL CALC-SCNC: 12 MMOL/L (ref 7–16)
AST SERPL-CCNC: 25 U/L (ref 12–45)
AST SERPL-CCNC: 25 U/L (ref 12–45)
BILIRUB SERPL-MCNC: 1.5 MG/DL (ref 0.1–1.5)
BILIRUB SERPL-MCNC: 1.5 MG/DL (ref 0.1–1.5)
BUN SERPL-MCNC: 14 MG/DL (ref 8–22)
BUN SERPL-MCNC: 14 MG/DL (ref 8–22)
CALCIUM ALBUM COR SERPL-MCNC: 9 MG/DL (ref 8.5–10.5)
CALCIUM ALBUM COR SERPL-MCNC: 9 MG/DL (ref 8.5–10.5)
CALCIUM SERPL-MCNC: 9.3 MG/DL (ref 8.5–10.5)
CALCIUM SERPL-MCNC: 9.3 MG/DL (ref 8.5–10.5)
CHLORIDE SERPL-SCNC: 95 MMOL/L (ref 96–112)
CHLORIDE SERPL-SCNC: 95 MMOL/L (ref 96–112)
CHOLEST SERPL-MCNC: 209 MG/DL (ref 100–199)
CO2 SERPL-SCNC: 25 MMOL/L (ref 20–33)
CO2 SERPL-SCNC: 25 MMOL/L (ref 20–33)
CREAT SERPL-MCNC: 0.47 MG/DL (ref 0.5–1.4)
CREAT SERPL-MCNC: 0.5 MG/DL (ref 0.5–1.4)
GFR SERPLBLD CREATININE-BSD FMLA CKD-EPI: 106 ML/MIN/1.73 M 2
GFR SERPLBLD CREATININE-BSD FMLA CKD-EPI: 107 ML/MIN/1.73 M 2
GLOBULIN SER CALC-MCNC: 2.2 G/DL (ref 1.9–3.5)
GLOBULIN SER CALC-MCNC: 2.3 G/DL (ref 1.9–3.5)
GLUCOSE SERPL-MCNC: 99 MG/DL (ref 65–99)
GLUCOSE SERPL-MCNC: 99 MG/DL (ref 65–99)
HDLC SERPL-MCNC: 98 MG/DL
LDLC SERPL CALC-MCNC: 101 MG/DL
PHOSPHATE SERPL-MCNC: 3.1 MG/DL (ref 2.5–4.5)
POTASSIUM SERPL-SCNC: 3.2 MMOL/L (ref 3.6–5.5)
POTASSIUM SERPL-SCNC: 3.2 MMOL/L (ref 3.6–5.5)
PROT SERPL-MCNC: 6.6 G/DL (ref 6–8.2)
PROT SERPL-MCNC: 6.7 G/DL (ref 6–8.2)
PTH-INTACT SERPL-MCNC: 58 PG/ML (ref 14–72)
SODIUM SERPL-SCNC: 132 MMOL/L (ref 135–145)
SODIUM SERPL-SCNC: 132 MMOL/L (ref 135–145)
TRIGL SERPL-MCNC: 52 MG/DL (ref 0–149)

## 2024-10-16 PROCEDURE — 82306 VITAMIN D 25 HYDROXY: CPT

## 2024-10-16 PROCEDURE — 84165 PROTEIN E-PHORESIS SERUM: CPT

## 2024-10-16 PROCEDURE — 80053 COMPREHEN METABOLIC PANEL: CPT

## 2024-10-16 PROCEDURE — 84100 ASSAY OF PHOSPHORUS: CPT

## 2024-10-16 PROCEDURE — 80053 COMPREHEN METABOLIC PANEL: CPT | Mod: 91

## 2024-10-16 PROCEDURE — 86364 TISS TRNSGLTMNASE EA IG CLAS: CPT | Mod: 91

## 2024-10-16 PROCEDURE — 84155 ASSAY OF PROTEIN SERUM: CPT

## 2024-10-16 PROCEDURE — 83937 ASSAY OF OSTEOCALCIN: CPT

## 2024-10-16 PROCEDURE — 36415 COLL VENOUS BLD VENIPUNCTURE: CPT

## 2024-10-16 PROCEDURE — 83970 ASSAY OF PARATHORMONE: CPT

## 2024-10-16 PROCEDURE — 80061 LIPID PANEL: CPT

## 2024-10-16 PROCEDURE — 83519 RIA NONANTIBODY: CPT

## 2024-10-16 PROCEDURE — 84156 ASSAY OF PROTEIN URINE: CPT

## 2024-10-16 PROCEDURE — 82523 COLLAGEN CROSSLINKS: CPT

## 2024-10-16 PROCEDURE — 86258 DGP ANTIBODY EACH IG CLASS: CPT | Mod: 91

## 2024-10-18 LAB
GLIADIN IGA SER IA-ACNC: <0.72 FLU (ref 0–4.99)
GLIADIN IGG SER IA-ACNC: <0.56 FLU (ref 0–4.99)
TTG IGA SER IA-ACNC: <1.02 FLU (ref 0–4.99)
TTG IGG SER IA-ACNC: <0.82 FLU (ref 0–4.99)

## 2024-10-19 LAB
COLLAGEN CTX SERPL-MCNC: 104 PG/ML
MISCELLANEOUS LAB RESULT MISCLAB: NORMAL

## 2024-10-21 LAB
ALBUMIN 24H MFR UR ELPH: 100 %
ALBUMIN SERPL ELPH-MCNC: 4.31 G/DL (ref 3.75–5.01)
ALPHA1 GLOB 24H MFR UR ELPH: 0 %
ALPHA1 GLOB SERPL ELPH-MCNC: 0.26 G/DL (ref 0.19–0.46)
ALPHA2 GLOB 24H MFR UR ELPH: 0 %
ALPHA2 GLOB SERPL ELPH-MCNC: 0.64 G/DL (ref 0.48–1.05)
B-GLOBULIN 24H MFR UR ELPH: 0 %
B-GLOBULIN SERPL ELPH-MCNC: 0.61 G/DL (ref 0.48–1.1)
COLLECT DURATION TIME SPEC: NORMAL HR
EER MONOCLONAL PROTEIN STUDY, 24 HOUR U Q5964: NORMAL
GAMMA GLOB 24H MFR UR ELPH: 0 %
GAMMA GLOB SERPL ELPH-MCNC: 0.68 G/DL (ref 0.62–1.51)
INTERPRETATION SERPL IFE-IMP: NORMAL
INTERPRETATION UR IFE-IMP: NORMAL
M PROTEIN 24H MFR UR ELPH: 0 %
M PROTEIN 24H UR ELPH-MRATE: NORMAL MG/24 HRS
MONOCLON BAND OBS SERPL: NORMAL
MONOCLONAL PROTEIN NL11656: NORMAL G/DL
PATHOLOGY STUDY: NORMAL
PROT 24H UR-MRATE: 9 MG/DL
PROT 24H UR-MRATE: NORMAL G/(24.H) (ref 40–150)
PROT SERPL-MCNC: 6.5 G/DL (ref 6.3–8.2)
SPECIMEN VOL ?TM UR: NORMAL ML
TEST NAME 95000: NORMAL

## 2024-10-28 ENCOUNTER — TELEPHONE (OUTPATIENT)
Dept: INTERNAL MEDICINE | Facility: IMAGING CENTER | Age: 64
End: 2024-10-28
Payer: COMMERCIAL

## 2024-10-28 RX ORDER — AZITHROMYCIN 500 MG/1
500 TABLET, FILM COATED ORAL DAILY
Qty: 5 TABLET | Refills: 0 | Status: SHIPPED | OUTPATIENT
Start: 2024-10-28 | End: 2024-11-02

## 2024-11-19 DIAGNOSIS — E87.6 HYPOKALEMIA: ICD-10-CM

## 2024-11-19 DIAGNOSIS — R82.994 IDIOPATHIC HYPERCALCIURIA: ICD-10-CM

## 2024-11-19 RX ORDER — POTASSIUM CITRATE 10 MEQ/1
10 TABLET, EXTENDED RELEASE ORAL DAILY
Qty: 90 TABLET | Refills: 1 | Status: SHIPPED | OUTPATIENT
Start: 2024-11-19

## 2024-11-20 NOTE — PROGRESS NOTES
Patient will  reduce chlorthalidone to half strength  and increase salt intake  and start potassium citrate - get K labs next week  She is on chlorthalidone for idiopathic hypercalciuria     Her 24 hr urine ca improved to 235    But with the fact that we are reducing her dose we will repeat her 24 hr urine ca again in 1 month

## 2024-11-21 ENCOUNTER — APPOINTMENT (OUTPATIENT)
Dept: ENDOCRINOLOGY | Facility: MEDICAL CENTER | Age: 64
End: 2024-11-21
Payer: COMMERCIAL

## 2024-11-21 ENCOUNTER — PATIENT MESSAGE (OUTPATIENT)
Dept: INTERNAL MEDICINE | Facility: IMAGING CENTER | Age: 64
End: 2024-11-21

## 2024-11-21 RX ORDER — SULFAMETHOXAZOLE AND TRIMETHOPRIM 800; 160 MG/1; MG/1
1 TABLET ORAL 2 TIMES DAILY
Qty: 20 TABLET | Refills: 0 | Status: SHIPPED | OUTPATIENT
Start: 2024-11-21

## 2024-11-21 RX ORDER — DOXYCYCLINE HYCLATE 100 MG
100 TABLET ORAL 2 TIMES DAILY
Qty: 20 TABLET | Refills: 0 | Status: SHIPPED
Start: 2024-11-21 | End: 2024-11-21

## 2024-12-23 ENCOUNTER — HOSPITAL ENCOUNTER (OUTPATIENT)
Dept: LAB | Facility: MEDICAL CENTER | Age: 64
End: 2024-12-23
Attending: INTERNAL MEDICINE
Payer: COMMERCIAL

## 2024-12-23 DIAGNOSIS — E87.6 HYPOKALEMIA: ICD-10-CM

## 2024-12-23 LAB — POTASSIUM SERPL-SCNC: 4.1 MMOL/L (ref 3.6–5.5)

## 2024-12-23 PROCEDURE — 84132 ASSAY OF SERUM POTASSIUM: CPT

## 2024-12-23 PROCEDURE — 36415 COLL VENOUS BLD VENIPUNCTURE: CPT

## 2025-01-09 ENCOUNTER — HOSPITAL ENCOUNTER (OUTPATIENT)
Facility: MEDICAL CENTER | Age: 65
End: 2025-01-09
Attending: INTERNAL MEDICINE
Payer: COMMERCIAL

## 2025-01-09 DIAGNOSIS — R82.994 IDIOPATHIC HYPERCALCIURIA: ICD-10-CM

## 2025-01-09 LAB
CREAT 24H UR-MSRATE: 1038 MG/24 HR (ref 800–1800)
CREAT UR-MCNC: 61.04 MG/DL
SPECIMEN VOL UR: 1700 ML

## 2025-01-09 PROCEDURE — 82570 ASSAY OF URINE CREATININE: CPT

## 2025-01-09 PROCEDURE — 81050 URINALYSIS VOLUME MEASURE: CPT

## 2025-01-09 PROCEDURE — 82340 ASSAY OF CALCIUM IN URINE: CPT

## 2025-01-11 LAB
CALCIUM 24H UR-MCNC: 19.3 MG/DL
CALCIUM 24H UR-MRATE: 328 MG/D (ref 100–250)
CALCIUM/CREAT 24H UR: 316 MG/G (ref 20–300)
COLLECT DURATION TIME SPEC: 24 HR
CREAT 24H UR-MCNC: 61 MG/DL
SPECIMEN VOL ?TM UR: 1700 ML

## 2025-01-14 ENCOUNTER — TELEMEDICINE (OUTPATIENT)
Dept: ENDOCRINOLOGY | Facility: MEDICAL CENTER | Age: 65
End: 2025-01-14
Attending: INTERNAL MEDICINE
Payer: COMMERCIAL

## 2025-01-14 VITALS — BODY MASS INDEX: 26.22 KG/M2 | HEIGHT: 63 IN | WEIGHT: 148 LBS

## 2025-01-14 DIAGNOSIS — R82.994 IDIOPATHIC HYPERCALCIURIA: ICD-10-CM

## 2025-01-14 DIAGNOSIS — M81.0 AGE-RELATED OSTEOPOROSIS WITHOUT CURRENT PATHOLOGICAL FRACTURE: ICD-10-CM

## 2025-01-14 DIAGNOSIS — E87.6 HYPOKALEMIA: ICD-10-CM

## 2025-01-14 DIAGNOSIS — E55.9 VITAMIN D DEFICIENCY: ICD-10-CM

## 2025-01-14 PROCEDURE — 99215 OFFICE O/P EST HI 40 MIN: CPT | Mod: 95 | Performed by: INTERNAL MEDICINE

## 2025-01-14 RX ORDER — 0.9 % SODIUM CHLORIDE 0.9 %
3 VIAL (ML) INJECTION PRN
OUTPATIENT
Start: 2025-07-01

## 2025-01-14 RX ORDER — DIPHENHYDRAMINE HYDROCHLORIDE 50 MG/ML
50 INJECTION INTRAMUSCULAR; INTRAVENOUS PRN
OUTPATIENT
Start: 2025-07-01

## 2025-01-14 RX ORDER — 0.9 % SODIUM CHLORIDE 0.9 %
10 VIAL (ML) INJECTION PRN
OUTPATIENT
Start: 2025-07-01

## 2025-01-14 RX ORDER — ACETAMINOPHEN 325 MG/1
650 TABLET ORAL ONCE
OUTPATIENT
Start: 2025-07-01 | End: 2025-07-01

## 2025-01-14 RX ORDER — METHYLPREDNISOLONE SODIUM SUCCINATE 125 MG/2ML
125 INJECTION, POWDER, LYOPHILIZED, FOR SOLUTION INTRAMUSCULAR; INTRAVENOUS PRN
OUTPATIENT
Start: 2025-07-01

## 2025-01-14 RX ORDER — EPINEPHRINE 1 MG/ML(1)
0.5 AMPUL (ML) INJECTION PRN
OUTPATIENT
Start: 2025-07-01

## 2025-01-14 RX ORDER — 0.9 % SODIUM CHLORIDE 0.9 %
VIAL (ML) INJECTION PRN
OUTPATIENT
Start: 2025-07-01

## 2025-01-14 RX ORDER — SODIUM CHLORIDE 9 MG/ML
INJECTION, SOLUTION INTRAVENOUS CONTINUOUS
OUTPATIENT
Start: 2025-07-01

## 2025-01-14 RX ORDER — ZOLEDRONIC ACID 0.05 MG/ML
5 INJECTION, SOLUTION INTRAVENOUS ONCE
OUTPATIENT
Start: 2025-07-01 | End: 2025-07-01

## 2025-01-14 ASSESSMENT — FIBROSIS 4 INDEX: FIB4 SCORE: 1.46

## 2025-01-14 NOTE — PROGRESS NOTES
Chief Complaint: Follow-up for osteoporosis and idiopathic hypercalciuria  Patient was presented for a telehealth consultation via secure and encrypted videoconferencing technology.   This encounter was conducted via Zoom . Verbal consent was obtained. Patient's identity was verified.    Virtual visit consent       This evaluation was conducted via Zoom using secure and encrypted videoconferencing technology.   The patient was (home or other private:[id number]) in the [facility].   The patient's identity was confirmed and verbal consent was obtained for this virtual visit.     This evaluation was conducted via Teams using secure and encrypted videoconferencing technology. The patient was in their home in the [facility].    The patient's identity was confirmed and verbal consent was obtained for this virtual visit.     Subjective:      Aruna Washington is a 64 y.o. female who is being seen for follow-up for the above medical issues    She was initially diagnosed with osteopenia on October 18, 2018 with the lowest T-score of -2.0 for the lumbar spine.  Follow-up bone density on September 10, 2020 showed progression to osteoporosis with lowest T-score of -2.5 her lumbar spine with 9% bone loss when compared to previous bone density.  She then had a workup done by her OB/GYN and was diagnosed with hypercalciuria in 2018 her baseline 24 urine calcium was over 400.  She had a thorough workup by an endocrinologist ruling out multiple myeloma, parathyroid disorder, thyroid disorder, Paget's disease, and milk-alkali syndrome.  She was initially treated with hydrochlorothiazide 12.5 mg daily.  However her repeat 24 urine calcium was not significantly better.  Incidentally at that time she was also treated with Fosamax for her osteoporosis but she was not able to tolerate the medication because of GI side effects.  She also tried a higher dose of hydrochlorothiazide when her 24 urine calcium was still high but she  did not tolerate the higher dose because of dizziness.  She went back to the 12.5 mg dose.  She was also referred to a hematologist when she had an abnormal SPEP showing low gammaglobulin levels but ultimately she was not diagnosed with multiple myeloma.      Prior to departing [place] her last bone density was on January 10, 2023 showing the lowest T-score of -2.7 for the lumbar spine compatible with osteoporosis.      With respect to her idiopathic hypercalciuria she denies a history of kidney stones.  No history of previous chemotherapy no history of ifosfamide exposure.  She does not have metabolic acidosis or signs of renal tubular acidosis on her previous lab work.  As previously stated hyperparathyroidism and hyperthyroidism have been ruled out as well as multiple myeloma.      She did have recent labs done by her primary care on June 19, 2024 showing normal TSH of 1.7 vitamin D of 53 B12 513 calcium was 9.4 with a serum creatinine 0.60 and estimated GFR of 101.            Currently:     Since her last consultation visit she was started on Reclast  For her osteoporosis which she received initial dose on July 2024 she initially had an infusion related reaction which improved over time.  She is able to tolerate the medication in general.      With respect to her idiopathic hypercalciuria she was started on chlorthalidone 25 mg daily   Which was effective in controlling her hypercalciuria    Her 24-hour urine calcium was normal at 236 on September 28, 2024      unfortunately she had hypokalemia on chlorthalidone and we started her on potassium citrate 10 mill equivalents daily.  She also reduced her chlorthalidone to 12.5 mg daily to help manage the hypokalemia    Unfortunately with the reduction of chlorthalidone her idiopathic hypercalciuria became uncontrolled and her 24-hour urine calcium was elevated at 328 on January 9, 2025 potassium was 4.1 on December 23, 2024      Her other previous labs on October 2024  showed normal vitamin D 56C telopeptide of 104, celiac disease screen was negative, SPEP was negative for myeloma PTH was normal at 58     TSH was normal 1.7 on July 19, 2024    Her last bone density on July 25, 2024 showed the lowest T-score of -2.3 for the lumbar spine and her FRAX scores were not significantly elevated.      Patient's medications, allergies, and social histories were reviewed and updated as appropriate.      ROS:     CONS:     No fever, no chills, no weight loss, no fatigue   EYES:      No diplopia, no blurry vision, no redness of eyes, no swelling of eyelids   ENT:    No hearing loss, No ear pain, No sore throat, no dysphagia, no neck swelling   CV:     No chest pain, no palpitations, no claudication, no orthopnea, no PND   PULM:    No SOB, no cough, no hemoptysis, no wheezing    GI:   No nausea, no vomiting, no diarrhea, no constipation, no bloody stools   :  Passing urine well, no dysuria, no hematuria   ENDO:   No polyuria, no polydipsia, no heat intolerance, no cold intolerance   NEURO: No headaches, no dizziness, no convulsions, no tremors   MUSC:  No joint swellings, no arthralgias, no myalgias, no weakness   SKIN:   No rash, no ulcers, no dry skin   PSYCH:   No depression, no anxiety, no difficulty sleeping       Past Medical History:  Patient Active Problem List    Diagnosis Date Noted    Low immunoglobulin level 03/12/2024    Hypogammaglobulinemia (HCC) 02/14/2024    Idiopathic hypercalciuria 06/07/2023    History of skin cancer 02/11/2021    Arthritis 02/11/2021    Osteoporosis 11/01/2018       Past Surgical History:  History reviewed. No pertinent surgical history.     Allergies:  Codeine and Penicillins     Current Medications:    Current Outpatient Medications:     sulfamethoxazole-trimethoprim (BACTRIM DS) 800-160 MG tablet, Take 1 Tablet by mouth 2 times a day., Disp: 20 Tablet, Rfl: 0    potassium citrate SR (UROCIT-K SR) 10 MEQ (1080 MG) Tab CR, Take 1 Tablet by mouth every  day., Disp: 90 Tablet, Rfl: 1    rosuvastatin (CRESTOR) 5 MG Tab, Take 1 Tablet by mouth every evening., Disp: 90 Tablet, Rfl: 3    VITAMIN D, CHOLECALCIFEROL, PO, Take 5,000 Int'l Units/1.7m2 by mouth. 2 times a week only per patient, Disp: , Rfl:     chlorthalidone (HYGROTON) 25 MG Tab, Take 1 Tablet by mouth every day., Disp: 30 Tablet, Rfl: 11    naproxen (ANAPROX) 220 MG tablet, Take 220 mg by mouth., Disp: , Rfl:     Social History:  Social History     Socioeconomic History    Marital status:      Spouse name: Not on file    Number of children: Not on file    Years of education: Not on file    Highest education level: Not on file   Occupational History    Not on file   Tobacco Use    Smoking status: Former     Types: Cigarettes    Smokeless tobacco: Never    Tobacco comments:     Smoke in high school   Substance and Sexual Activity    Alcohol use: Yes     Comment: on occasion    Drug use: Never    Sexual activity: Not on file   Other Topics Concern    Not on file   Social History Narrative    Not on file     Social Drivers of Health     Financial Resource Strain: Not on file   Food Insecurity: No Food Insecurity (2/12/2024)    Received from [facility]    Hunger Vital Sign     Worried About Running Out of Food in the Last Year: Never true     Ran Out of Food in the Last Year: Never true   Transportation Needs: Not on file   Physical Activity: Not on file   Stress: Not on file   Social Connections: Not on file   Intimate Partner Violence: Low Risk  (2/14/2024)    Received from [facility]     Patient unable to answer: Not on file     Does the patient feel safe from violence, abuse, and neglect in their home?: 1   Recent Concern: Intimate Partner Violence - High Risk (2/14/2024)    Received from [facility]     Patient unable to answer: Not on file     Does the patient feel safe from violence, abuse, and neglect in their home?: 1   Housing Stability:  "Not on file        Family History:   History reviewed. No pertinent family history.      PHYSICAL EXAM:   Vital signs: Ht 1.6 m (5' 3\")   Wt 67.1 kg (148 lb)   BMI 26.22 kg/m²   GENERAL: Well-developed, well-nourished  in no apparent distress.   EYE: No ocular and eyelid asymmetry, Anicteric sclerae,  PERRL, No exophthalmos or lidlag  HENT: Hearing grossly intact, Normocephalic, atraumatic. Pink, moist mucous membranes, No exudate  NECK: Supple. Trachea midline. thyroid is normal in size without nodules or tenderness  CARDIOVASCULAR: Regular rate and rhythm. No murmurs, rubs, or gallops.   LUNGS: Clear to auscultation bilaterally   ABDOMEN: Soft, nontender with positive bowel sounds.   EXTREMITIES: No clubbing, cyanosis, or edema.   NEUROLOGICAL: Cranial nerves II-XII are grossly intact   Symmetric reflexes at the patella no proximal muscle weakness, No visible tremor with both outstretched hands  LYMPH: No cervical, supraclavicular,  adenopathy palpated.   SKIN: No rashes, lesions. Turgor is normal.    Labs:  Lab Results   Component Value Date/Time    WBC 6.4 09/26/2024 03:55 PM    RBC 4.66 09/26/2024 03:55 PM    HEMOGLOBIN 14.2 09/26/2024 03:55 PM    MCV 89.3 09/26/2024 03:55 PM    MCH 30.5 09/26/2024 03:55 PM    MCHC 34.1 09/26/2024 03:55 PM    RDW 43.8 09/26/2024 03:55 PM    MPV 10.1 09/26/2024 03:55 PM       Lab Results   Component Value Date/Time    SODIUM 132 (L) 10/16/2024 09:20 AM    SODIUM 132 (L) 10/16/2024 09:20 AM    POTASSIUM 4.1 12/23/2024 10:04 AM    CHLORIDE 95 (L) 10/16/2024 09:20 AM    CHLORIDE 95 (L) 10/16/2024 09:20 AM    CO2 25 10/16/2024 09:20 AM    CO2 25 10/16/2024 09:20 AM    ANION 12.0 10/16/2024 09:20 AM    ANION 12.0 10/16/2024 09:20 AM    GLUCOSE 99 10/16/2024 09:20 AM    GLUCOSE 99 10/16/2024 09:20 AM    BUN 14 10/16/2024 09:20 AM    BUN 14 10/16/2024 09:20 AM    CREATININE 0.47 (L) 10/16/2024 09:20 AM    CREATININE 0.50 10/16/2024 09:20 AM    CALCIUM 9.3 10/16/2024 09:20 AM    " "CALCIUM 9.3 10/16/2024 09:20 AM    ASTSGOT 25 10/16/2024 09:20 AM    ASTSGOT 25 10/16/2024 09:20 AM    ALTSGPT 16 10/16/2024 09:20 AM    ALTSGPT 18 10/16/2024 09:20 AM    TBILIRUBIN 1.5 10/16/2024 09:20 AM    TBILIRUBIN 1.5 10/16/2024 09:20 AM    ALBUMIN 4.4 10/16/2024 09:20 AM    ALBUMIN 4.31 10/16/2024 09:20 AM    ALBUMIN 4.4 10/16/2024 09:20 AM    TOTPROTEIN 6.6 10/16/2024 09:20 AM    TOTPROTEIN 6.5 10/16/2024 09:20 AM    TOTPROTEIN 6.7 10/16/2024 09:20 AM    GLOBULIN 2.2 10/16/2024 09:20 AM    GLOBULIN 2.3 10/16/2024 09:20 AM    AGRATIO 2.0 10/16/2024 09:20 AM    AGRATIO 1.9 10/16/2024 09:20 AM       Lab Results   Component Value Date/Time    TSHULTRASEN 1.710 06/19/2024 0925     No results found for: \"FREET4\"  No results found for: \"FREET3\"  No results found for: \"THYSTIMIG\"      Imaging:  IMPRESSION:    Lowest T-score: -2.7  Category: Osteoporosis.    RECOMMENDED FOLLOW UP INTERVAL:  The following are \"general guidelines\" from [place] clinical management papers and based upon the assumption of a healthy patient not currently undergoing treatment for osteoporosis or having secondary risk factors for accelerated bone loss.    T-score >-2.0:  3-5 years  T-score -2.0 to <-2.5:  2-3 years  T-score -2.5 or less:  2 years    **Follow-up exams should always be performed on the same machine as the original test whenever possible. Scans performed on different machines cannot be directly correlated. **    Dictated by: Warren Kaye M.D. on 1/26/2023 2:06 PM PST   Electronically signed by: Warren Kaye M.D. on 1/26/2023 2:08 PM PST  Narrative    DEXA BONE DENSITY STUDY WO VERT FX ASSESSMENT    INDICATION: Age-related osteoporosis without current pathological fracture    TECHNIQUE: Bone densitometry was obtained scanning on a [employer] Discovery DXA unit.    COMPARISON: None.    FINDINGS:    Left Hip  T-Score: -1.8  BMD: 0.725 g/cm2    Left Femoral neck  T-Score: -2.2  BMD: 0.608 g/cm2    Lumbar spine  T-Score: " -2.7  BMD: 0.749 g/cm2    LSC at this DXA center:  Spine: 0.025 g/cm2  Hip: 0.022 g/cm2  Forearm: 0.018 g/cm2    WHO CLASSIFICATION (based upon T-score)  Normal -1.0 or greater  Osteopenia -1.1 to -2.4  Osteoporosis -2.5 or less  Exam End: 01/26/23  1:25 PM    Specimen Collected: 01/26/23  2:06 PM Last Resulted: 01/26/23  2:08 PM   Received From: [facility]  Result Received: 05/13/24 11:49 AM     7/25/2024 9:50 AM     HISTORY/REASON FOR EXAM:  History of osteoporosis, postmenopausal, family history of osteoporosis, history of parent with hip fracture.     TECHNIQUE/EXAM DESCRIPTION AND NUMBER OF VIEWS:  Dual x-ray bone densitometry was performed from the L1 through L4 levels and from the proximal left femur utilizing the GE Prodigy unit.     COMPARISON: None     FINDINGS:  The lumbar spine has a mean bone mineral density of 0.902 g/cm2, with a T score of -2.3 and a Z score of -1.0.     The proximal left femur has a mean bone mineral density of 0.804 g/cm2, with a T score of -1.6 and a Z score of -0.6.     IMPRESSION:     According to the World Health Organization classification, bone mineral density of this patient is osteopenic in the lumbar spine and in the proximal left femur.     10-year Probability of Fracture:  Major Osteoporotic     10.9%  Hip     1.7%  Population      [place] ()     Based on left femur neck BMD     INTERPRETING LOCATION: [address] [place] [address], [id number]    ASSESSMENT/PLAN:     1. Age-related osteoporosis without current pathological fracture  Stable  Her T-scores are improved on her last bone density.  Continue Reclast.  Discussed premedication prior to infusion to mitigate infusion related reactions to Reclast  Discussed the importance of regular weightbearing exercise  Discussed the importance of good dental hygiene and regular dental visits  Discussed the importance of adequate calcium and vitamin supplementation  Reviewed fall  precautions  She should notify me if she has any falls or fractures  Repeat bone density on July 25, 2024 or 2026 depending on insurance    2. Idiopathic hypercalciuria  Uncontrolled  Urine calcium is elevated with chlorthalidone 12.5 mg will increase the dose back to 25 mg daily and repeat 24 urine calcium and potassium in 1 month    3. Hypokalemia  Resolved hypokalemia is now controlled with potassium citrate 10 mill equivalents daily will repeat her potassium in 1 month because I am increasing her chlorthalidone again to 25 mg daily    4. Vitamin D deficiency  Stable   Vitamin D labs were reviewed with patient  Continue current supplements  Continue monitoring levels       Return in about 7 months (around 8/14/2025).      Total time spent on day of service was over 60 minutes which included obtaining a detailed history and physical exam, ordering labs, coordinating care and scheduling future follow-up       Thank you kindly for allowing me to participate in the endocrine care plan for this patient.    Mich Aguiar MD, FACE, [facility]      CC:   Chloé Hansen M.D.

## 2025-03-22 ENCOUNTER — HOSPITAL ENCOUNTER (OUTPATIENT)
Dept: LAB | Facility: MEDICAL CENTER | Age: 65
End: 2025-03-22
Attending: INTERNAL MEDICINE
Payer: COMMERCIAL

## 2025-03-22 DIAGNOSIS — R82.994 IDIOPATHIC HYPERCALCIURIA: ICD-10-CM

## 2025-03-22 DIAGNOSIS — E87.6 HYPOKALEMIA: ICD-10-CM

## 2025-03-22 LAB
ALBUMIN SERPL BCP-MCNC: 4.4 G/DL (ref 3.2–4.9)
ALBUMIN/GLOB SERPL: 1.9 G/DL
ALP SERPL-CCNC: 48 U/L (ref 30–99)
ALT SERPL-CCNC: 13 U/L (ref 2–50)
ANION GAP SERPL CALC-SCNC: 12 MMOL/L (ref 7–16)
AST SERPL-CCNC: 20 U/L (ref 12–45)
BILIRUB SERPL-MCNC: 1 MG/DL (ref 0.1–1.5)
BUN SERPL-MCNC: 20 MG/DL (ref 8–22)
CALCIUM ALBUM COR SERPL-MCNC: 9.2 MG/DL (ref 8.5–10.5)
CALCIUM SERPL-MCNC: 9.5 MG/DL (ref 8.5–10.5)
CHLORIDE SERPL-SCNC: 99 MMOL/L (ref 96–112)
CO2 SERPL-SCNC: 25 MMOL/L (ref 20–33)
CREAT 24H UR-MSRATE: 1356 MG/24 HR (ref 800–1800)
CREAT SERPL-MCNC: 0.73 MG/DL (ref 0.5–1.4)
CREAT UR-MCNC: 67.8 MG/DL
GFR SERPLBLD CREATININE-BSD FMLA CKD-EPI: 92 ML/MIN/1.73 M 2
GLOBULIN SER CALC-MCNC: 2.3 G/DL (ref 1.9–3.5)
GLUCOSE SERPL-MCNC: 78 MG/DL (ref 65–99)
POTASSIUM SERPL-SCNC: 3.8 MMOL/L (ref 3.6–5.5)
PROT SERPL-MCNC: 6.7 G/DL (ref 6–8.2)
SODIUM SERPL-SCNC: 136 MMOL/L (ref 135–145)
SPECIMEN VOL UR: 2000 ML

## 2025-03-22 PROCEDURE — 80053 COMPREHEN METABOLIC PANEL: CPT

## 2025-03-22 PROCEDURE — 82570 ASSAY OF URINE CREATININE: CPT

## 2025-03-22 PROCEDURE — 82340 ASSAY OF CALCIUM IN URINE: CPT

## 2025-03-22 PROCEDURE — 81050 URINALYSIS VOLUME MEASURE: CPT

## 2025-03-22 PROCEDURE — 36415 COLL VENOUS BLD VENIPUNCTURE: CPT

## 2025-03-25 ENCOUNTER — RESULTS FOLLOW-UP (OUTPATIENT)
Dept: ENDOCRINOLOGY | Facility: MEDICAL CENTER | Age: 65
End: 2025-03-25

## 2025-03-25 LAB
CALCIUM 24H UR-MCNC: 16.5 MG/DL
CALCIUM 24H UR-MRATE: 330 MG/D (ref 100–250)
CALCIUM/CREAT 24H UR: 243 MG/G (ref 20–300)
COLLECT DURATION TIME SPEC: 24 HR
CREAT 24H UR-MCNC: 68 MG/DL
SPECIMEN VOL ?TM UR: 2000 ML

## 2025-05-19 ENCOUNTER — PATIENT MESSAGE (OUTPATIENT)
Dept: ENDOCRINOLOGY | Facility: MEDICAL CENTER | Age: 65
End: 2025-05-19
Payer: COMMERCIAL

## 2025-05-19 DIAGNOSIS — E87.6 HYPOKALEMIA: ICD-10-CM

## 2025-05-19 NOTE — PATIENT COMMUNICATION
Received request via: patient via [facility]    Was the patient seen in the last year in this department? Yes    Does the patient have an active prescription (recently filled or refills available) for medication(s) requested? No    [facility]    Does the patient have half-way Plus and need 100-day supply? (This applies to ALL medications) Patient does not have SCP

## 2025-05-20 RX ORDER — POTASSIUM CITRATE 1080 MG/1
10 TABLET, EXTENDED RELEASE ORAL DAILY
Qty: 90 TABLET | Refills: 1 | Status: SHIPPED | OUTPATIENT
Start: 2025-05-20

## 2025-06-26 DIAGNOSIS — R82.994 IDIOPATHIC HYPERCALCIURIA: ICD-10-CM

## 2025-06-26 RX ORDER — CHLORTHALIDONE 25 MG/1
TABLET ORAL
Qty: 90 TABLET | Refills: 1 | Status: SHIPPED | OUTPATIENT
Start: 2025-06-26

## 2025-07-07 ENCOUNTER — HOSPITAL ENCOUNTER (OUTPATIENT)
Dept: LAB | Facility: MEDICAL CENTER | Age: 65
End: 2025-07-07
Attending: INTERNAL MEDICINE
Payer: COMMERCIAL

## 2025-07-07 DIAGNOSIS — E87.6 HYPOKALEMIA: ICD-10-CM

## 2025-07-07 DIAGNOSIS — E55.9 VITAMIN D DEFICIENCY: ICD-10-CM

## 2025-07-07 DIAGNOSIS — R82.994 IDIOPATHIC HYPERCALCIURIA: ICD-10-CM

## 2025-07-07 DIAGNOSIS — M81.0 AGE-RELATED OSTEOPOROSIS WITHOUT CURRENT PATHOLOGICAL FRACTURE: ICD-10-CM

## 2025-07-07 LAB
25(OH)D3 SERPL-MCNC: 58 NG/ML (ref 30–100)
ALBUMIN SERPL BCP-MCNC: 4.2 G/DL (ref 3.2–4.9)
ALBUMIN/GLOB SERPL: 1.8 G/DL
ALP SERPL-CCNC: 46 U/L (ref 30–99)
ALT SERPL-CCNC: 13 U/L (ref 2–50)
ANION GAP SERPL CALC-SCNC: 12 MMOL/L (ref 7–16)
AST SERPL-CCNC: 19 U/L (ref 12–45)
BILIRUB SERPL-MCNC: 1 MG/DL (ref 0.1–1.5)
BUN SERPL-MCNC: 14 MG/DL (ref 8–22)
CALCIUM ALBUM COR SERPL-MCNC: 9 MG/DL (ref 8.5–10.5)
CALCIUM SERPL-MCNC: 9.2 MG/DL (ref 8.5–10.5)
CHLORIDE SERPL-SCNC: 97 MMOL/L (ref 96–112)
CO2 SERPL-SCNC: 27 MMOL/L (ref 20–33)
CREAT SERPL-MCNC: 0.65 MG/DL (ref 0.5–1.4)
GFR SERPLBLD CREATININE-BSD FMLA CKD-EPI: 99 ML/MIN/1.73 M 2
GLOBULIN SER CALC-MCNC: 2.3 G/DL (ref 1.9–3.5)
GLUCOSE SERPL-MCNC: 86 MG/DL (ref 65–99)
PHOSPHATE SERPL-MCNC: 2.9 MG/DL (ref 2.5–4.5)
POTASSIUM SERPL-SCNC: 3.5 MMOL/L (ref 3.6–5.5)
PROT SERPL-MCNC: 6.5 G/DL (ref 6–8.2)
PTH-INTACT SERPL-MCNC: 52.8 PG/ML (ref 14–72)
SODIUM SERPL-SCNC: 136 MMOL/L (ref 135–145)

## 2025-07-07 PROCEDURE — 82523 COLLAGEN CROSSLINKS: CPT

## 2025-07-07 PROCEDURE — 82306 VITAMIN D 25 HYDROXY: CPT

## 2025-07-07 PROCEDURE — 84100 ASSAY OF PHOSPHORUS: CPT

## 2025-07-07 PROCEDURE — 80053 COMPREHEN METABOLIC PANEL: CPT

## 2025-07-07 PROCEDURE — 36415 COLL VENOUS BLD VENIPUNCTURE: CPT

## 2025-07-07 PROCEDURE — 83970 ASSAY OF PARATHORMONE: CPT

## 2025-07-09 LAB — COLLAGEN CTX SERPL-MCNC: 158 PG/ML

## 2025-07-14 DIAGNOSIS — E87.6 HYPOKALEMIA: Primary | ICD-10-CM

## 2025-08-04 ENCOUNTER — HOSPITAL ENCOUNTER (OUTPATIENT)
Dept: LAB | Facility: MEDICAL CENTER | Age: 65
End: 2025-08-04
Attending: INTERNAL MEDICINE
Payer: COMMERCIAL

## 2025-08-04 DIAGNOSIS — E87.6 HYPOKALEMIA: ICD-10-CM

## 2025-08-04 LAB — POTASSIUM SERPL-SCNC: 3.4 MMOL/L (ref 3.6–5.5)

## 2025-08-04 PROCEDURE — 84132 ASSAY OF SERUM POTASSIUM: CPT

## 2025-08-04 PROCEDURE — 36415 COLL VENOUS BLD VENIPUNCTURE: CPT

## 2025-08-07 ENCOUNTER — OUTPATIENT INFUSION SERVICES (OUTPATIENT)
Facility: MEDICAL CENTER | Age: 65
End: 2025-08-07
Attending: INTERNAL MEDICINE
Payer: COMMERCIAL

## 2025-08-07 VITALS
RESPIRATION RATE: 18 BRPM | HEART RATE: 62 BPM | OXYGEN SATURATION: 97 % | WEIGHT: 150.91 LBS | SYSTOLIC BLOOD PRESSURE: 108 MMHG | DIASTOLIC BLOOD PRESSURE: 71 MMHG | TEMPERATURE: 97 F | BODY MASS INDEX: 27.77 KG/M2 | HEIGHT: 62 IN

## 2025-08-07 DIAGNOSIS — M80.00XA AGE-RELATED OSTEOPOROSIS WITH CURRENT PATHOLOGICAL FRACTURE, INITIAL ENCOUNTER: Primary | ICD-10-CM

## 2025-08-07 LAB
CA-I SERPL-SCNC: 1.21 MMOL/L (ref 1.1–1.3)
CREAT SERPL-MCNC: 0.66 MG/DL (ref 0.5–1.4)
GFR SERPLBLD CREATININE-BSD FMLA CKD-EPI: 98 ML/MIN/1.73 M 2

## 2025-08-07 PROCEDURE — A9270 NON-COVERED ITEM OR SERVICE: HCPCS | Performed by: INTERNAL MEDICINE

## 2025-08-07 PROCEDURE — 36415 COLL VENOUS BLD VENIPUNCTURE: CPT

## 2025-08-07 PROCEDURE — 700105 HCHG RX REV CODE 258: Performed by: INTERNAL MEDICINE

## 2025-08-07 PROCEDURE — 82565 ASSAY OF CREATININE: CPT

## 2025-08-07 PROCEDURE — 96365 THER/PROPH/DIAG IV INF INIT: CPT

## 2025-08-07 PROCEDURE — 700102 HCHG RX REV CODE 250 W/ 637 OVERRIDE(OP): Performed by: INTERNAL MEDICINE

## 2025-08-07 PROCEDURE — 82330 ASSAY OF CALCIUM: CPT

## 2025-08-07 PROCEDURE — 700111 HCHG RX REV CODE 636 W/ 250 OVERRIDE (IP): Performed by: INTERNAL MEDICINE

## 2025-08-07 RX ORDER — ACETAMINOPHEN 325 MG/1
650 TABLET ORAL ONCE
Status: COMPLETED | OUTPATIENT
Start: 2025-08-07 | End: 2025-08-07

## 2025-08-07 RX ORDER — ZOLEDRONIC ACID 0.05 MG/ML
5 INJECTION, SOLUTION INTRAVENOUS ONCE
OUTPATIENT
Start: 2026-08-08 | End: 2026-08-08

## 2025-08-07 RX ORDER — 0.9 % SODIUM CHLORIDE 0.9 %
3 VIAL (ML) INJECTION PRN
OUTPATIENT
Start: 2026-08-08

## 2025-08-07 RX ORDER — DIPHENHYDRAMINE HYDROCHLORIDE 50 MG/ML
50 INJECTION, SOLUTION INTRAMUSCULAR; INTRAVENOUS PRN
OUTPATIENT
Start: 2026-08-08

## 2025-08-07 RX ORDER — 0.9 % SODIUM CHLORIDE 0.9 %
10 VIAL (ML) INJECTION PRN
OUTPATIENT
Start: 2026-08-08

## 2025-08-07 RX ORDER — EPINEPHRINE 1 MG/ML(1)
0.5 AMPUL (ML) INJECTION PRN
OUTPATIENT
Start: 2026-08-08

## 2025-08-07 RX ORDER — ZOLEDRONIC ACID 0.05 MG/ML
5 INJECTION, SOLUTION INTRAVENOUS ONCE
Status: COMPLETED | OUTPATIENT
Start: 2025-08-07 | End: 2025-08-07

## 2025-08-07 RX ORDER — ACETAMINOPHEN 325 MG/1
650 TABLET ORAL ONCE
OUTPATIENT
Start: 2026-08-08 | End: 2026-08-08

## 2025-08-07 RX ORDER — METHYLPREDNISOLONE SODIUM SUCCINATE 125 MG/2ML
125 INJECTION, POWDER, LYOPHILIZED, FOR SOLUTION INTRAMUSCULAR; INTRAVENOUS PRN
OUTPATIENT
Start: 2026-08-08

## 2025-08-07 RX ORDER — SODIUM CHLORIDE 9 MG/ML
INJECTION, SOLUTION INTRAVENOUS CONTINUOUS
Status: DISCONTINUED | OUTPATIENT
Start: 2025-08-07 | End: 2025-08-07 | Stop reason: HOSPADM

## 2025-08-07 RX ORDER — SODIUM CHLORIDE 9 MG/ML
INJECTION, SOLUTION INTRAVENOUS CONTINUOUS
OUTPATIENT
Start: 2026-08-08

## 2025-08-07 RX ORDER — 0.9 % SODIUM CHLORIDE 0.9 %
VIAL (ML) INJECTION PRN
OUTPATIENT
Start: 2026-08-08

## 2025-08-07 RX ADMIN — SODIUM CHLORIDE: 9 INJECTION, SOLUTION INTRAVENOUS at 10:18

## 2025-08-07 RX ADMIN — ACETAMINOPHEN 650 MG: 325 TABLET ORAL at 10:17

## 2025-08-07 RX ADMIN — ZOLEDRONIC ACID 5 MG: 5 INJECTION, SOLUTION INTRAVENOUS at 10:39

## 2025-08-07 ASSESSMENT — PAIN DESCRIPTION - PAIN TYPE: TYPE: ACUTE PAIN

## 2025-08-07 ASSESSMENT — FIBROSIS 4 INDEX: FIB4 SCORE: 1.32

## 2025-08-11 DIAGNOSIS — E87.6 HYPOKALEMIA: ICD-10-CM

## 2025-08-11 RX ORDER — POTASSIUM CITRATE 1080 MG/1
20 TABLET, EXTENDED RELEASE ORAL DAILY
Qty: 180 TABLET | Refills: 1 | Status: SHIPPED | OUTPATIENT
Start: 2025-08-11